# Patient Record
Sex: MALE | Race: BLACK OR AFRICAN AMERICAN | HISPANIC OR LATINO | Employment: FULL TIME | URBAN - METROPOLITAN AREA
[De-identification: names, ages, dates, MRNs, and addresses within clinical notes are randomized per-mention and may not be internally consistent; named-entity substitution may affect disease eponyms.]

---

## 2018-05-09 ENCOUNTER — CONVERSION ENCOUNTER (OUTPATIENT)
Dept: FAMILY MEDICINE CLINIC | Facility: CLINIC | Age: 38
End: 2018-05-09

## 2018-05-09 ENCOUNTER — OFFICE VISIT CONVERTED (OUTPATIENT)
Dept: FAMILY MEDICINE CLINIC | Facility: CLINIC | Age: 38
End: 2018-05-09
Attending: NURSE PRACTITIONER

## 2018-05-24 ENCOUNTER — CONVERSION ENCOUNTER (OUTPATIENT)
Dept: FAMILY MEDICINE CLINIC | Facility: CLINIC | Age: 38
End: 2018-05-24

## 2018-05-24 ENCOUNTER — OFFICE VISIT CONVERTED (OUTPATIENT)
Dept: FAMILY MEDICINE CLINIC | Facility: CLINIC | Age: 38
End: 2018-05-24
Attending: NURSE PRACTITIONER

## 2018-09-06 ENCOUNTER — OFFICE VISIT CONVERTED (OUTPATIENT)
Dept: FAMILY MEDICINE CLINIC | Facility: CLINIC | Age: 38
End: 2018-09-06
Attending: NURSE PRACTITIONER

## 2018-10-24 ENCOUNTER — CONVERSION ENCOUNTER (OUTPATIENT)
Dept: FAMILY MEDICINE CLINIC | Facility: CLINIC | Age: 38
End: 2018-10-24

## 2018-10-24 ENCOUNTER — OFFICE VISIT CONVERTED (OUTPATIENT)
Dept: FAMILY MEDICINE CLINIC | Facility: CLINIC | Age: 38
End: 2018-10-24
Attending: NURSE PRACTITIONER

## 2018-11-08 ENCOUNTER — OFFICE VISIT CONVERTED (OUTPATIENT)
Dept: FAMILY MEDICINE CLINIC | Facility: CLINIC | Age: 38
End: 2018-11-08
Attending: NURSE PRACTITIONER

## 2020-10-16 ENCOUNTER — HOSPITAL ENCOUNTER (OUTPATIENT)
Dept: FAMILY MEDICINE CLINIC | Facility: CLINIC | Age: 40
Discharge: HOME | End: 2020-10-16
Attending: NURSE PRACTITIONER

## 2020-10-16 ENCOUNTER — OFFICE VISIT CONVERTED (OUTPATIENT)
Dept: FAMILY MEDICINE CLINIC | Facility: CLINIC | Age: 40
End: 2020-10-16
Attending: NURSE PRACTITIONER

## 2020-10-16 LAB
CHOLEST SERPL-MCNC: 217 MG/DL (ref 107–200)
CHOLEST/HDLC SERPL: 3.2 {RATIO} (ref 3–6)
HDLC SERPL-MCNC: 68 MG/DL (ref 40–60)
LDLC SERPL CALC-MCNC: 138 MG/DL (ref 70–100)
TRIGL SERPL-MCNC: 55 MG/DL (ref 40–150)
VLDLC SERPL-MCNC: 11 MG/DL (ref 5–37)

## 2020-10-17 LAB — 25(OH)D3 SERPL-MCNC: 25.9 NG/ML (ref 30–100)

## 2020-10-28 ENCOUNTER — OFFICE VISIT CONVERTED (OUTPATIENT)
Dept: CARDIOLOGY | Facility: CLINIC | Age: 40
End: 2020-10-28
Attending: INTERNAL MEDICINE

## 2020-10-28 ENCOUNTER — CONVERSION ENCOUNTER (OUTPATIENT)
Dept: CARDIOLOGY | Facility: CLINIC | Age: 40
End: 2020-10-28

## 2020-11-20 ENCOUNTER — HOSPITAL ENCOUNTER (OUTPATIENT)
Dept: CARDIOLOGY | Facility: HOSPITAL | Age: 40
Discharge: HOME | End: 2020-11-20
Attending: INTERNAL MEDICINE

## 2021-01-22 ENCOUNTER — OFFICE VISIT CONVERTED (OUTPATIENT)
Dept: FAMILY MEDICINE CLINIC | Facility: CLINIC | Age: 41
End: 2021-01-22
Attending: NURSE PRACTITIONER

## 2021-01-22 ENCOUNTER — CONVERSION ENCOUNTER (OUTPATIENT)
Dept: FAMILY MEDICINE CLINIC | Facility: CLINIC | Age: 41
End: 2021-01-22

## 2021-02-27 ENCOUNTER — HOSPITAL ENCOUNTER (OUTPATIENT)
Dept: URGENT CARE | Facility: CLINIC | Age: 41
Discharge: HOME | End: 2021-02-27
Attending: PHYSICIAN ASSISTANT

## 2021-03-02 LAB — SARS-COV-2 RNA SPEC QL NAA+PROBE: NOT DETECTED

## 2021-04-23 ENCOUNTER — HOSPITAL ENCOUNTER (OUTPATIENT)
Dept: FAMILY MEDICINE CLINIC | Facility: CLINIC | Age: 41
Discharge: HOME | End: 2021-04-23
Attending: NURSE PRACTITIONER

## 2021-04-23 ENCOUNTER — OFFICE VISIT CONVERTED (OUTPATIENT)
Dept: FAMILY MEDICINE CLINIC | Facility: CLINIC | Age: 41
End: 2021-04-23
Attending: NURSE PRACTITIONER

## 2021-04-23 LAB
25(OH)D3 SERPL-MCNC: 37.5 NG/ML (ref 30–100)
ALBUMIN SERPL-MCNC: 4.2 G/DL (ref 3.5–5)
ALBUMIN/GLOB SERPL: 1.5 {RATIO} (ref 1.4–2.6)
ALP SERPL-CCNC: 79 U/L (ref 53–128)
ALT SERPL-CCNC: 25 U/L (ref 10–40)
ANION GAP SERPL CALC-SCNC: 14 MMOL/L (ref 8–19)
AST SERPL-CCNC: 20 U/L (ref 15–50)
BILIRUB SERPL-MCNC: 0.71 MG/DL (ref 0.2–1.3)
BUN SERPL-MCNC: 10 MG/DL (ref 5–25)
BUN/CREAT SERPL: 9 {RATIO} (ref 6–20)
CALCIUM SERPL-MCNC: 9.2 MG/DL (ref 8.7–10.4)
CHLORIDE SERPL-SCNC: 104 MMOL/L (ref 99–111)
CHOLEST SERPL-MCNC: 198 MG/DL (ref 107–200)
CHOLEST/HDLC SERPL: 3.1 {RATIO} (ref 3–6)
CONV CO2: 27 MMOL/L (ref 22–32)
CONV TOTAL PROTEIN: 7 G/DL (ref 6.3–8.2)
CREAT UR-MCNC: 1.17 MG/DL (ref 0.7–1.2)
GFR SERPLBLD BASED ON 1.73 SQ M-ARVRAT: >60 ML/MIN/{1.73_M2}
GLOBULIN UR ELPH-MCNC: 2.8 G/DL (ref 2–3.5)
GLUCOSE SERPL-MCNC: 77 MG/DL (ref 70–99)
HDLC SERPL-MCNC: 63 MG/DL (ref 40–60)
LDLC SERPL CALC-MCNC: 124 MG/DL (ref 70–100)
OSMOLALITY SERPL CALC.SUM OF ELEC: 290 MOSM/KG (ref 273–304)
POTASSIUM SERPL-SCNC: 4.2 MMOL/L (ref 3.5–5.3)
SODIUM SERPL-SCNC: 141 MMOL/L (ref 135–147)
TRIGL SERPL-MCNC: 53 MG/DL (ref 40–150)
VLDLC SERPL-MCNC: 11 MG/DL (ref 5–37)

## 2021-05-10 NOTE — H&P
History and Physical      Patient Name: Humphrey Iglesias   Patient ID: [id number]   Sex: Male   YOB: 1980    Primary Care Provider: Shannon Crocker MD   Referring Provider: Ken KWAN    Visit Date: 2020    Provider: Maximino Grider MD   Location: [facility]   Location Address: [address] [place] [address], [facility]   [place]  [id number]   Location Phone: [phone]          History Of Present Illness  Consult requested by: Ken KWAN   I saw Humphrey Iglesias in the office today. This is a 40 year old, /Black male. He has no previous cardiac history. He has been having intermittent chest discomfort over the last few weeks. He recently had an episode that was more significant, 5 to 6 out of 10, described as a pressure in central chest, non-radiating. He has had some occasional left-arm discomfort as well. He has been under stress recently, and recently his father  of uncertain causes but suspected cardiac death. The patient stays relatively active. Symptoms do not seem to be triggered by exertion and are random in occurrence.   PAST MEDICAL HISTORY: includes seasonal allergies. PAST SURGICAL HISTORY: Negative.   PSYCHOSOCIAL HISTORY: He smoked very lightly when he was much younger but not since then. Rare alcohol. Moderate caffeine intake. He is . He works as a  at a local company.   FAMILY HISTORY: Positive for hypertension, heart disease and diabetes.   CURRENT MEDICATIONS: include Claritin; Zyrtec. The dosage and frequency of the medications were reviewed with the patient.   ALLERGIES: No known drug allergies.       Review of Systems  · Constitutional  o Admits  o : good general health lately  o Denies  o : fatigue, recent weight changes   · Eyes  o Denies  o : double vision  · HENT  o Denies  o : hearing loss or ringing, chronic sinus problem, swollen glands in neck  · Cardiovascular  o Admits  o : chest pain  o Denies  o :  "palpitations (fast, fluttering, or skipping beats), swelling (feet, ankles, hands), shortness of breath while walking or lying flat  · Respiratory  o Denies  o : chronic or frequent cough, asthma or wheezing, COPD  · Gastrointestinal  o Denies  o : ulcers, nausea or vomiting  · Neurologic  o Admits  o : lightheaded or dizzy  o Denies  o : stroke, headaches  · Musculoskeletal  o Denies  o : joint pain, back pain  · Endocrine  o Denies  o : thyroid disease, diabetes, heat or cold intolerance, excessive thirst or urination  · Heme-Lymph  o Denies  o : bleeding or bruising tendency, anemia      Vitals  Date Time BP Position Site L\R Cuff Size HR RR TEMP (F) WT  HT  BMI kg/m2 BSA m2 O2 Sat FR L/min FiO2 HC       10/28/2020 10:08 /76 Sitting    70 - R   233lbs 16oz 6'  2\" 30.04 2.35             Physical Examination  · Constitutional  o Appearance  o : Overweight, while male, pleasant, in no acute distress.  · Head and Face  o HEENT  o : No pallor, anicteric. Eyes normal. Moist mucous membranes.  · Neck  o Inspection/Palpation  o : Supple. No hepatosplenomegaly.  o Jugular Veins  o : No JVD. No carotid bruits.  · Respiratory  o Auscultation of Lungs  o : Clear to auscultation bilaterally. No crackles or wheezing.  · Cardiovascular  o Heart  o : S1, S2 is normally heard. No S3. No murmur, rubs, or gallops.  · Gastrointestinal  o Abdominal Examination  o : Soft, non-distended. No palpable hepatosplenomegaly. Bowel sounds heard in all four quadrants.  · Musculoskeletal  o General  o : Normal muscle tone and strength.  · Skin and Subcutaneous Tissue  o General Inspection  o : No skin rashes.  · Extremities  o Extremities  o : Warm and well perfused. Distal pulses present. No pitting pedal edema.     His EKG was reviewed from October 14th that showed sinus rhythm, borderline first degree AV block, non-specific intraventricular conduction delay, no acute ST changes.    Emergency room records were reviewed.  His CBC was " normal.  CMP normal.  LDL cholesterol 138, TRG 55, .  Troponin was negative.    Chest X-ray showed no acute disease.           Assessment     Intermittent chest pain:  Somewhat atypical in pattern for angina.  He has no chronic cardiac risk factors.  His basic evaluation in the emergency room was low risk.       Plan     Schedule a stress echocardiogram to evaluate for any evidence of structural problems or ischemia.  If this  appears low risk, at this time no additional cardiac workup is indicated.  The patient is agreeable with this plan.  We will call the patient with results when available.    GABRIELLA everett/maylin           This note was transcribed by Bouchra Soriano.  maylin/cbd  The above service was transcribed by Bouchra Soriano, and I attest to the accuracy of the note.  CBD.             Electronically Signed by: Bouchra Soriano-, -Author on November 2, 2020 05:48:57 AM  Electronically Co-signed by: Maximino Grider MD -Reviewer on November 2, 2020 09:19:44 AM

## 2021-05-11 ENCOUNTER — HOSPITAL ENCOUNTER (OUTPATIENT)
Dept: GENERAL RADIOLOGY | Facility: HOSPITAL | Age: 41
Discharge: HOME | End: 2021-05-11
Attending: NURSE PRACTITIONER

## 2021-05-12 ENCOUNTER — OFFICE VISIT CONVERTED (OUTPATIENT)
Dept: ORTHOPEDIC SURGERY | Facility: CLINIC | Age: 41
End: 2021-05-12
Attending: ORTHOPAEDIC SURGERY

## 2021-05-14 VITALS
TEMPERATURE: 97.3 F | SYSTOLIC BLOOD PRESSURE: 104 MMHG | OXYGEN SATURATION: 96 % | WEIGHT: 244.06 LBS | HEART RATE: 75 BPM | DIASTOLIC BLOOD PRESSURE: 68 MMHG | HEIGHT: 74 IN | BODY MASS INDEX: 31.32 KG/M2

## 2021-05-14 VITALS
HEART RATE: 80 BPM | TEMPERATURE: 97 F | SYSTOLIC BLOOD PRESSURE: 108 MMHG | HEIGHT: 74 IN | DIASTOLIC BLOOD PRESSURE: 66 MMHG | WEIGHT: 249.37 LBS | OXYGEN SATURATION: 97 % | BODY MASS INDEX: 32 KG/M2

## 2021-05-14 VITALS
HEIGHT: 74 IN | SYSTOLIC BLOOD PRESSURE: 134 MMHG | OXYGEN SATURATION: 99 % | BODY MASS INDEX: 30.3 KG/M2 | HEART RATE: 74 BPM | WEIGHT: 236.12 LBS | DIASTOLIC BLOOD PRESSURE: 82 MMHG | TEMPERATURE: 97.1 F

## 2021-05-14 VITALS
DIASTOLIC BLOOD PRESSURE: 76 MMHG | HEIGHT: 74 IN | SYSTOLIC BLOOD PRESSURE: 124 MMHG | WEIGHT: 234 LBS | HEART RATE: 70 BPM | BODY MASS INDEX: 30.03 KG/M2

## 2021-05-14 NOTE — PROGRESS NOTES
Progress Note      Patient Name: Humphrey Iglesias   Patient ID: [id number]   Sex: Male   YOB: 1980    Primary Care Provider: Shannon Crocker MD   Referring Provider: Ken KWAN    Visit Date: January 22, 2021    Provider: DOMENIC Michaud   Location: [facility]   Location Address: [address] [place] [address], [address]  [place], [place]  [id number]   Location Phone: [phone]          Chief Complaint  · 3 month follow up      History Of Present Illness  Humphrey Iglesias is a 40 year old /Black male who presents for evaluation and treatment of:      Today for 3-month follow-up on vitamin D deficiency and chest pain.  He has recently seen cardiology.  He has had no chest pain for the past 3 months.  He had an EKG that showed borderline first-degree AV block type I.  Echo was within normal limits.  EF of 55-60%.       Past Medical History  Allergic rhinitis         Past Surgical History  *Denies any surgical procedures         Medication List  Vitamin D3 125 mcg (5,000 unit) oral tablet; Zyrtec oral         Allergy List  NO KNOWN DRUG ALLERGIES         Family Medical History  Diabetes, unspecified type         Social History  Tobacco (Former)         Immunizations  Name Date Admin   Tdap 02/29/2016         Review of Systems  · Constitutional  o Denies  o : fatigue, night sweats  · Eyes  o Denies  o : double vision, blurred vision  · HENT  o Denies  o : vertigo, recent head injury  · Cardiovascular  o Denies  o : chest pain, irregular heart beats, rapid heart rate, dyspnea on exertion  · Respiratory  o Denies  o : shortness of breath, productive cough  · Gastrointestinal  o Denies  o : nausea, vomiting, diarrhea, constipation  · Genitourinary  o Denies  o : dysuria, urinary retention  · Integument  o Denies  o : hair growth change, new skin lesions  · Neurologic  o Denies  o : altered mental status, seizures  · Musculoskeletal  o Denies  o : joint swelling, limitation of  "motion  · Endocrine  o Denies  o : cold intolerance, heat intolerance  · Heme-Lymph  o Denies  o : petechiae, lymph node enlargement or tenderness  · Allergic-Immunologic  o Denies  o : frequent illnesses      Vitals  Date Time BP Position Site L\R Cuff Size HR RR TEMP (F) WT  HT  BMI kg/m2 BSA m2 O2 Sat FR L/min FiO2 HC       01/22/2021 08:09 /68 Sitting    75 - R  97.3 244lbs 1oz 6'  2\" 31.34 2.4 96 %            Physical Examination  · Constitutional  o Appearance  o : alert, in no acute distress  · Head and Face  o Head  o :   § Inspection  § : atraumatic, normocephalic  o Face  o :   § Inspection  § : no facial lesions  o HEENT  o : Unremarkable  · Eyes  o Conjunctivae  o : conjunctivae normal  o Sclerae  o : sclerae white  o Pupils and Irises  o : pupils equal and round, pupils reactive to light bilaterally  o Eyelids/Ocular Adnexae  o : eyelid appearance normal  · Neck  o Inspection/Palpation  o : normal appearance, no masses or tenderness, trachea midline  o Thyroid  o : gland size normal, nontender, no nodules or masses present on palpation  · Respiratory  o Respiratory Effort  o : breathing unlabored  o Auscultation of Lungs  o : normal breath sounds  · Cardiovascular  o Heart  o :   § Auscultation of Heart  § : regular rate, normal rhythm, no murmurs present  o Peripheral Vascular System  o :   § Extremities  § : no edema  · Gastrointestinal  o Abdominal Examination  o : abdomen nontender to palpation, normal bowel sounds, tone normal without rigidity or guarding, no masses present  o Liver and spleen  o : no hepatomegaly present  · Lymphatic  o Neck  o : no lymphadenopathy   o Supraclavicular Nodes  o : no supraclavicular nodes          Assessment  · Hyperlipidemia     272.4/E78.5  Low-cholesterol diet  · Vitamin D deficiency     268.9/E55.9  Vitamin D 2 50,000 units weekly  · Need for influenza vaccination     V04.81/Z23  Declines      Plan  · Orders  o ACO-39: Current medications updated and " reviewed (, 1159F) - - 01/22/2021  · Medications  o Vitamin D2 1,250 mcg (50,000 unit) oral capsule   SIG: take 1 capsule by oral route weekly   DISP: (13) Capsule with 2 refills  Prescribed on 01/22/2021     o Vitamin D3 125 mcg (5,000 unit) oral tablet   SIG: take 1 tablet by oral route daily for 90 days   DISP: (90) Tablet with 1 refills  Discontinued on 01/22/2021     · Instructions  o Advised that cheeses and other sources of dairy fats, animal fats, fast food, and the extras (candy, pastries, pies, doughnuts and cookies) all contain LDL raising nutrients. Advised to increase fruits, vegetables, whole grains, and to monitor portion sizes.   o Patient was educated/instructed on their diagnosis, treatment and medications prior to discharge from the clinic today.  o Patient instructed to seek medical attention urgently for new or worsening symptoms.  o Call the office with any concerns or questions.  · Disposition  o Call or Return if symptoms worsen or persist.  o f/u 3 months            Electronically Signed by: DOMENIC Michaud -Author on January 22, 2021 08:37:21 AM

## 2021-05-14 NOTE — PROGRESS NOTES
Progress Note      Patient Name: Humphrey Iglesias   Patient ID: [id number]   Sex: Male   YOB: 1980    Primary Care Provider: Shannon Crocker MD   Referring Provider: Shannon Crocker MD    Visit Date: April 23, 2021    Provider: DOMENIC Michaud   Location: [facility]   Location Address: [address] [place] [address], [address] [place] [address]  [id number]   Location Phone: [phone]          Chief Complaint  · 3 month f/u      History Of Present Illness  Humphrey Iglesias is a 40 year old /Black male who presents for evaluation and treatment of:      Presents today for 3-month follow-up on vitamin D deficiency, hyperlipidemia, and intermittent chest pain.  He has not had any intermittent chest pain over the past 3 months.  He is follow low-cholesterol diet for his hyperlipidemia.  He is taking vitamin D2 50,000 units weekly for vitamin D deficiency.  Only complaint today is right knee pain.  He has intermittent right knee pain primarily on the lateral medial side.  Denies swelling and redness.  He states approximately 3 to 5 years ago that he had dislocated his knee.  He had an x-ray done at [facility] at that time.  History of allergic rhinitis.  He has some nasal congestion but is well controlled with Claritin or Zyrtec that he takes       Past Medical History  Allergic rhinitis         Past Surgical History  *Denies any surgical procedures         Medication List  Claritin 10 mg oral tablet; Vitamin D2 1,250 mcg (50,000 unit) oral capsule; Zyrtec oral         Allergy List  NO KNOWN DRUG ALLERGIES         Family Medical History  Diabetes, unspecified type         Social History  Tobacco (Former)         Immunizations  Name Date Admin   Tdap 02/29/2016         Review of Systems  · Constitutional  o Denies  o : fatigue, night sweats  · Eyes  o Denies  o : double vision, blurred vision  · HENT  o Admits  o : nasal congestion  o Denies  o : headaches, vertigo,  "lightheadedness, recent head injury, sinus pain, postnasal drip, sore throat  · Cardiovascular  o Denies  o : chest pain, irregular heart beats  · Respiratory  o Denies  o : shortness of breath, productive cough  · Gastrointestinal  o Denies  o : nausea, vomiting, diarrhea, constipation, reflux, abdominal pain, blood in stools  · Genitourinary  o Denies  o : dysuria, urinary retention  · Integument  o Denies  o : hair growth change, new skin lesions  · Neurologic  o Denies  o : altered mental status, seizures  · Musculoskeletal  o Admits  o : joint pain, knee pain  o Denies  o : joint swelling, limitation of motion  · Endocrine  o Denies  o : cold intolerance, heat intolerance  · Psychiatric  o Denies  o : anxiety, depression  · Heme-Lymph  o Denies  o : petechiae, lymph node enlargement or tenderness  · Allergic-Immunologic  o Denies  o : frequent illnesses      Vitals  Date Time BP Position Site L\R Cuff Size HR RR TEMP (F) WT  HT  BMI kg/m2 BSA m2 O2 Sat FR L/min FiO2 HC       04/23/2021 08:23 /66 Sitting    80 - R  97 249lbs 6oz 6'  2\" 32.02 2.43 97 %            Physical Examination  · Constitutional  o Appearance  o : alert, in no acute distress  · Head and Face  o Head  o :   § Inspection  § : atraumatic, normocephalic  o Face  o :   § Inspection  § : no facial lesions  o HEENT  o : Unremarkable  · Eyes  o Conjunctivae  o : conjunctivae normal  o Sclerae  o : sclerae white  o Pupils and Irises  o : pupils equal and round, pupils reactive to light bilaterally  o Eyelids/Ocular Adnexae  o : eyelid appearance normal  · Ears, Nose, Mouth and Throat  o Ears  o :   § External Ears  § : appearance within normal limits, no lesions present  o Nose  o :   § External Nose  § : appearance normal  o Oral Cavity  o :   § Oral Mucosa  § : oral mucosa normal  § Lips  § : lip appearance normal  § Teeth  § : normal dentition for age  § Gums  § : gums pink, non-swollen, no bleeding present  § Tongue  § : tongue appearance " normal  § Palate  § : hard palate normal, soft palate appearance normal  · Neck  o Inspection/Palpation  o : normal appearance, no masses or tenderness, trachea midline  o Thyroid  o : gland size normal, nontender, no nodules or masses present on palpation  · Respiratory  o Respiratory Effort  o : breathing unlabored  o Auscultation of Lungs  o : normal breath sounds  · Cardiovascular  o Heart  o :   § Auscultation of Heart  § : regular rate, normal rhythm, no murmurs present  o Peripheral Vascular System  o :   § Extremities  § : no edema  · Gastrointestinal  o Abdominal Examination  o : abdomen nontender to palpation, normal bowel sounds, tone normal without rigidity or guarding, no masses present  o Liver and spleen  o : no hepatomegaly present  · Lymphatic  o Neck  o : no lymphadenopathy   o Supraclavicular Nodes  o : no supraclavicular nodes  · Right Knee  o Inspection  o : no redness, swelling, deformity, bruising, or effusion  o Palpation  o : non tender to palpation, no crepitus  o Range of Motion  o : normal range of motion  o Reflexes  o : normal reflexes  o Special Tests  o : Bulge sign/Balloon sign/Ballotting the Patella are negative; Nayeli test reveals no click is appreciated; Valgus Stress test is normal; Varus Stress test is normal; Anterior Drawer sign is negative; Lachman's test is negative; Posterior Drawer sign is negative          Assessment  · Allergic rhinitis due to allergen     477.9/J30.9  Continue current regimen. Discussed if symptoms worsen may need Flonase or Nasacort  · Hyperlipidemia     272.4/E78.5  CMP and lipid  · Vitamin D deficiency     268.9/E55.9  Check vitamin D  · Knee pain, right     719.46/M25.561  X-ray right knee, consult orthopedics      Plan  · Orders  o CMP [facility] (06074) - 272.4/E78.5, 268.9/E55.9 - 04/23/2021  o Lipid Panel [facility] (01165) - 272.4/E78.5 - 04/23/2021  o Vitamin D (25-Hydroxy) Level (90998) - 268.9/E55.9 - 04/23/2021  o ACO-39: Current medications updated  and reviewed (, 1159F) - - 04/23/2021  o ORTHOPEDIC CONSULTATION (ORTHO) - 719.46/M25.561 - 04/23/2021  o Xray knee right [facility] (26008-IS) - 719.46/M25.561 - 04/23/2021  · Instructions  o Advised that cheeses and other sources of dairy fats, animal fats, fast food, and the extras (candy, pastries, pies, doughnuts and cookies) all contain LDL raising nutrients. Advised to increase fruits, vegetables, whole grains, and to monitor portion sizes.   o Patient was educated/instructed on their diagnosis, treatment and medications prior to discharge from the clinic today.  o Patient instructed to seek medical attention urgently for new or worsening symptoms.  o Call the office with any concerns or questions.  · Disposition  o Call or Return if symptoms worsen or persist.  o f/u 6 months            Electronically Signed by: DOMENIC Michaud -Author on April 23, 2021 08:55:52 AM

## 2021-05-16 VITALS
HEART RATE: 63 BPM | TEMPERATURE: 98.7 F | WEIGHT: 241 LBS | HEIGHT: 74 IN | DIASTOLIC BLOOD PRESSURE: 66 MMHG | SYSTOLIC BLOOD PRESSURE: 122 MMHG | BODY MASS INDEX: 30.93 KG/M2 | OXYGEN SATURATION: 99 %

## 2021-05-16 VITALS
DIASTOLIC BLOOD PRESSURE: 64 MMHG | HEART RATE: 76 BPM | SYSTOLIC BLOOD PRESSURE: 132 MMHG | TEMPERATURE: 98.9 F | BODY MASS INDEX: 30.54 KG/M2 | HEIGHT: 74 IN | OXYGEN SATURATION: 99 % | WEIGHT: 238 LBS

## 2021-05-16 VITALS
DIASTOLIC BLOOD PRESSURE: 84 MMHG | HEART RATE: 87 BPM | OXYGEN SATURATION: 98 % | HEIGHT: 74 IN | SYSTOLIC BLOOD PRESSURE: 132 MMHG | BODY MASS INDEX: 30.74 KG/M2 | TEMPERATURE: 98 F | WEIGHT: 239.5 LBS

## 2021-05-16 VITALS
SYSTOLIC BLOOD PRESSURE: 106 MMHG | TEMPERATURE: 98.1 F | OXYGEN SATURATION: 98 % | HEIGHT: 74 IN | HEART RATE: 70 BPM | WEIGHT: 241.5 LBS | BODY MASS INDEX: 30.99 KG/M2 | DIASTOLIC BLOOD PRESSURE: 76 MMHG

## 2021-05-16 VITALS
OXYGEN SATURATION: 99 % | WEIGHT: 239 LBS | SYSTOLIC BLOOD PRESSURE: 120 MMHG | BODY MASS INDEX: 30.67 KG/M2 | DIASTOLIC BLOOD PRESSURE: 72 MMHG | HEIGHT: 74 IN | TEMPERATURE: 98.6 F | HEART RATE: 71 BPM

## 2021-06-02 ENCOUNTER — HOSPITAL ENCOUNTER (OUTPATIENT)
Dept: MRI IMAGING | Facility: HOSPITAL | Age: 41
Discharge: HOME | End: 2021-06-02
Attending: ORTHOPAEDIC SURGERY

## 2021-06-05 NOTE — H&P
History and Physical      Patient Name: Humphrey Iglesias   Patient ID: [id number]   Sex: Male   YOB: 1980    Primary Care Provider: Shannon Crocker MD   Referring Provider: Shannon Crocker MD    Visit Date: May 12, 2021    Provider: Randy Peterson MD   Location: [facility]   Location Address: [address] [place] [address]  [id number]   Location Phone: [phone]          Chief Complaint  · Right Knee Pain      History Of Present Illness  Humphrey Iglesias is a 40 year old /Black male who presents today to [facility].      Patient presents today for an evaluation of right knee. About 10 years ago he dislocated his whole knee while playing basketball. He states that he had landed poorly on his right knee, felt a pop and noticed his leg was moved over. He was given a leg brace and sent home by ER. He states since then his knee has bothered him off and on. He complains more of pain than instability. He is cautious on how he moves on his right knee because if he moves too swiftly he feels like his knee will buckle and give way. He avoid pivoting, twisting or turning on his knee. He works at [employer] on [employer].       Past Medical History  Allergic rhinitis         Past Surgical History  *Denies any surgical procedures         Medication List  Claritin 10 mg oral tablet; Vitamin D2 1,250 mcg (50,000 unit) oral capsule; Zyrtec oral         Allergy List  NO KNOWN DRUG ALLERGIES       Allergies Reconciled  Family Medical History  Diabetes, unspecified type         Social History  Alcohol Use (Current some day); lives with children; lives with spouse; .; Recreational Drug Use (Former); Tobacco (Former); Working         Immunizations  Name Date Admin   Tdap 02/29/2016         Review of Systems  · Constitutional  o Denies  o : fever, chills, weight loss  · Cardiovascular  o Denies  o : chest pain, shortness of breath  · Gastrointestinal  o Denies  o : liver disease,  "heartburn, nausea, blood in stools  · Genitourinary  o Denies  o : painful urination, blood in urine  · Integument  o Denies  o : rash, itching  · Neurologic  o Denies  o : headache, weakness, loss of consciousness  · Musculoskeletal  o Denies  o : painful, swollen joints  · Psychiatric  o Denies  o : drug/alcohol addiction, anxiety, depression      Vitals  Date Time BP Position Site L\R Cuff Size HR RR TEMP (F) WT  HT  BMI kg/m2 BSA m2 O2 Sat FR L/min FiO2        05/12/2021 07:49 AM      78 - R   257lbs 0oz 6'  2\" 33 2.47 97 %            Physical Examination  · Constitutional  o Appearance  o : well developed, well-nourished, no obvious deformities present  · Head and Face  o Head  o :   § Inspection  § : normocephalic  o Face  o :   § Inspection  § : no facial lesions  · Eyes  o Conjunctivae  o : conjunctivae normal  o Sclerae  o : sclerae white  · Ears, Nose, Mouth and Throat  o Ears  o :   § External Ears  § : appearance within normal limits  § Hearing  § : intact  o Nose  o :   § External Nose  § : appearance normal  · Neck  o Inspection/Palpation  o : normal appearance  o Range of Motion  o : full range of motion  · Respiratory  o Respiratory Effort  o : breathing unlabored  o Inspection of Chest  o : normal appearance  o Auscultation of Lungs  o : no audible wheezing or rales  · Cardiovascular  o Heart  o : regular rate  · Gastrointestinal  o Abdominal Examination  o : soft and non-tender  · Skin and Subcutaneous Tissue  o General Inspection  o : intact, no rashes  · Psychiatric  o General  o : Alert and oriented x3  o Judgement and Insight  o : judgment and insight intact  o Mood and Affect  o : mood normal, affect appropriate  · Right Knee  o Inspection  o : Calf supple, non-tender. Skin intact. Sensation grossly intact. Neurovascular intact. No swelling, skin discoloration or atrophy. Tender medial and lateral joint line. Weight bearing. Stable to valgus/varus stress. Good strength in quadriceps, " hamstrings, dorsiflexors, and plantar flexors. Positive Lachman. Full flexion and extension. Mildly antalgic gait.   · Imaging  o Imaging  o : 4/23/21 RIGHT KNEE X-RAY: CONCLUSION: Normal examination.           Assessment  · Right knee pain, unspecified chronicity     719.46/M25.561      Plan  · Medications  o Medications have been Reconciled  o Transition of Care or Provider Policy  · Instructions  o Dr. Peterson saw and examined the patient and agrees with plan.   o X-rays reviewed by Dr. Peterson.  o Reviewed the patient's Past Medical, Social, and Family history as well as the ROS at today's visit, no changes.  o Call or return if worsening symptoms.  o Follow up after MRI.  o The above service was scribed by Mari Quinn on my behalf and I attest to the accuracy of the note. filippo  o Discussed treatment plans and diagnosis with the patient. We discussed getting an MRI to rule out an ACL tear. Patient wishes to proceed with obtaining an MRI of his right knee.            Electronically Signed by: Mari Quinn-, Other -Author on May 12, 2021 02:25:24 PM  Electronically Co-signed by: Randy Peterson MD -Reviewer on May 14, 2021 09:25:59 PM

## 2021-06-07 ENCOUNTER — OFFICE VISIT (OUTPATIENT)
Dept: ORTHOPEDIC SURGERY | Facility: CLINIC | Age: 41
End: 2021-06-07

## 2021-06-07 VITALS — HEIGHT: 74 IN | HEART RATE: 72 BPM | BODY MASS INDEX: 32.98 KG/M2 | OXYGEN SATURATION: 97 % | WEIGHT: 257 LBS

## 2021-06-07 DIAGNOSIS — M76.51 PATELLAR TENDONITIS OF RIGHT KNEE: Primary | ICD-10-CM

## 2021-06-07 PROCEDURE — 99213 OFFICE O/P EST LOW 20 MIN: CPT | Performed by: ORTHOPAEDIC SURGERY

## 2021-06-07 RX ORDER — ERGOCALCIFEROL 1.25 MG/1
50000 CAPSULE ORAL
COMMUNITY
Start: 2021-04-20 | End: 2021-10-29 | Stop reason: SDUPTHER

## 2021-06-07 RX ORDER — CETIRIZINE HYDROCHLORIDE 10 MG/1
CAPSULE, LIQUID FILLED ORAL
COMMUNITY

## 2021-06-07 RX ORDER — LORATADINE 10 MG/1
TABLET ORAL
COMMUNITY

## 2021-06-07 NOTE — PROGRESS NOTES
"Chief Complaint  Follow-up of the Right Knee     Subjective      Humphrey Iglesias presents to [facility] for a follow-up of the right knee. Patient presents today with MRI results of his right knee. To review, about 10 years ago he dislocated his whole knee while playing basketball. He states that he had landed poorly on his right knee, felt a pop and noticed his leg was moved over. He was given a leg brace and sent home by ER. He states since then his knee has bothered him off and on. He complains more of pain than instability. He is cautious on how he moves on his right knee because if he moves too swiftly he feels like his knee will buckle and give way. He avoid pivoting, twisting or turning on his knee. He works at [employer] on [address]. Since his last visit with us he states that his pain has improved some. He does complain of pain with weight bearing on his right knee. He feels pain about the patella. He still tries avoiding any pivoting and twisting motion.     No Known Allergies     Social History     Socioeconomic History   • Marital status:      Spouse name: Not on file   • Number of children: Not on file   • Years of education: Not on file   • Highest education level: Not on file   Tobacco Use   • Smoking status: Former Smoker   Substance and Sexual Activity   • Alcohol use: Yes     Comment: occasionally drinks, less than 1 drink per day, has been drinking for 3 years   • Drug use: Not Currently     Comment: former in the past        Review of Systems     Objective   Vital Signs:   Pulse 72   Ht 188 cm (74\")   Wt 117 kg (257 lb)   SpO2 97%   BMI 33.00 kg/m²       Physical Exam  Constitutional:       Appearance: Normal appearance. He is well-developed and normal weight.   HENT:      Head: Normocephalic.      Right Ear: Hearing and external ear normal.      Left Ear: Hearing and external ear normal.      Nose: Nose normal.   Eyes:      Conjunctiva/sclera: Conjunctivae " normal.   Cardiovascular:      Rate and Rhythm: Normal rate.   Pulmonary:      Effort: Pulmonary effort is normal.      Breath sounds: No wheezing or rales.   Abdominal:      Palpations: Abdomen is soft.      Tenderness: There is no abdominal tenderness.   Musculoskeletal:      Cervical back: Normal range of motion.   Skin:     Findings: No rash.   Neurological:      Mental Status: He is alert and oriented to person, place, and time.   Psychiatric:         Mood and Affect: Mood and affect normal.         Judgment: Judgment normal.     Ortho Exam   Calf supple, non-tender. Skin intact. Sensation grossly intact. Neurovascular intact. No swelling, skin discoloration or atrophy. Tender medial and lateral joint line. Weight bearing. Stable to valgus/varus stress. Good strength in quadriceps, hamstrings, dorsiflexors, and plantar flexors. Negative Lachman. Full flexion and extension. Non-antalgic gait. Good tone of hip flexors, hip extensors, hip adductor, hip abductors.      Procedures    Imaging Results (Most Recent)     None           Result Review :       PROCEDURE: KNEE 3 VIEWS RIGHT     COMPARISON: [facility] , KNEE 3 VIEWS RT, 8/22/2010, 18:22.     INDICATIONS: RIGHT KNEE PAIN ON AND OFF FOR YEARS. NO INJURY.     FINDINGS:   BONES: Normal.  No significant arthropathy or acute abnormality.    SOFT TISSUES: Negative.  No visible soft tissue swelling.    EFFUSION: None visible.    OTHER: Negative.       CONCLUSION: Normal examination.        JANE MONK MD         Electronically Signed and Approved By: JANE MONK MD on 5/11/2021 at 12:23                 PROCEDURE: MRI RIGHT KNEE WO CONTRAST         [facility]  [facility]21[facility] 11:42.         INDICATIONS: TRANSIENT RIGHT ANTEROLATERAL KNEE PAIN.  POST INJURY 10 YEARS AGO.         TECHNIQUE: A complete multi-planar MRI was  performed.           FINDINGS:     SOFT TISSUES:  Trace knee joint fluid.  Elongated 1.4 cm osteophyte or osteochondral body along the     posterior rim of the medial tibial plateau, posterior to the medial meniscus posterior horn. No     popliteal cyst. No soft tissue mass.         MENISCI: The medial meniscus is intact. The lateral meniscus is intact.         CRUCIATE LIGAMENTS: The ACL is intact. The PCL is intact.         COLLATERAL LIGAMENTS: The MCL is intact. The LCL complex is intact.         EXTENSOR MECHANISM: The quadriceps tendon is intact. The patellar tendon is intact.         ARTICULAR CARTILAGE:  Focal full-thickness chondral fissuring at the medial patellar facet.  The     articular cartilage in the medial and lateral compartments is fairly well maintained.         BONES:  Likely degenerative subchondral cystic changes at the posterior medial tibial plateau.  No     fracture.  No concerning bone marrow lesion or marrow replacing process.         CONCLUSION:     1. Focal full-thickness chondral fissuring at the medial patellar facet.    2. Elongated osteochondral body or osteophyte along the posterior margin of the medial tibial     plateau with degenerative subchondral cystic changes in the posterior medial tibial plateau.    3. Otherwise, unremarkable MRI of the knee.  The menisci, cruciate ligaments, and collateral     ligaments are intact.          CLAUS DEAN MD           Electronically Signed and Approved By: CLAUS DEAN MD on 6/02/2021     Assessment and Plan     DX: Right knee injury    Right knee pain     Call or return if worsening symptoms.    Follow Up     Discussed treatment options and diagnosis. We discussed injections and physical therapy. Patient states pain isn't unbearable, so he will hold off on an injection at this time. Patient was provided with a prescription for physical therapy if he wishes to attend. We also suggested taking anti-inflammatory.       Patient was given  instructions and counseling regarding his condition or for health maintenance advice. Please see specific information pulled into the AVS if appropriate.     Scribed for Randy Peterson MD by Mari Quinn.  06/07/21   08:28 EDT    I have personally performed the services described in this document as scribed by the above individual, and it is both accurate and complete.  Randy Peterson MD  6/7/2021  08:52 EDT

## 2021-06-11 ENCOUNTER — TREATMENT (OUTPATIENT)
Dept: PHYSICAL THERAPY | Facility: CLINIC | Age: 41
End: 2021-06-11

## 2021-06-11 DIAGNOSIS — G89.29 CHRONIC PAIN OF RIGHT KNEE: ICD-10-CM

## 2021-06-11 DIAGNOSIS — M25.561 CHRONIC PAIN OF RIGHT KNEE: ICD-10-CM

## 2021-06-11 DIAGNOSIS — R29.898 DECREASED STRENGTH INVOLVING KNEE JOINT: ICD-10-CM

## 2021-06-11 DIAGNOSIS — M76.51 PATELLAR TENDONITIS OF RIGHT KNEE: Primary | ICD-10-CM

## 2021-06-11 PROCEDURE — 97110 THERAPEUTIC EXERCISES: CPT | Performed by: PHYSICAL THERAPIST

## 2021-06-11 PROCEDURE — 97161 PT EVAL LOW COMPLEX 20 MIN: CPT | Performed by: PHYSICAL THERAPIST

## 2021-06-11 NOTE — PROGRESS NOTES
"Physical Therapy Initial Evaluation and Plan of Care      Patient: Humphrey Iglesias   : 1980  Diagnosis/ICD-10 Code:  Patellar tendonitis of right knee [M76.51]  Referring practitioner: Randy Peterson MD  Date of Initial Visit: 2021  Today's Date: 2021  Patient seen for 1 sessions    Progress note due: 2021  Re-cert due: 2021           Subjective Questionnaire: LEFS: 70/80    Precautions/Contraindication: n/a      Subjective Evaluation    History of Present Illness  Mechanism of injury: H&P: \"Humphrey Iglesias presents to [facility] for a follow-up of the right knee. Patient presents today with MRI results of his right knee. To review, about 10 years ago he dislocated his whole knee while playing basketball. He states that he had landed poorly on his right knee, felt a pop and noticed his leg was moved over. He was given a leg brace and sent home by ER. He states since then his knee has bothered him off and on. He complains more of pain than instability. He is cautious on how he moves on his right knee because if he moves too swiftly he feels like his knee will buckle and give way. He avoid pivoting, twisting or turning on his knee. He works at [employer] on [employer]. Since his last visit with us he states that his pain has improved some. He does complain of pain with weight bearing on his right knee. He feels pain about the patella. He still tries avoiding any pivoting and twisting motion. \"     MRI: . Focal full-thickness chondral fissuring at the medial patellar facet.    2. Elongated osteochondral body or osteophyte along the posterior margin of the medial tibial     plateau with degenerative subchondral cystic changes in the posterior medial tibial plateau.    3. Otherwise, unremarkable MRI of the knee.  The menisci, cruciate ligaments, and collateral     ligaments are intact.           Subjective comment: Pt states his knee prevent him from doing any extranous " but he is still about to walk and do daily activities  Patient Occupation: : be to lift up to 70 lb Pain  Current pain ratin  At worst pain ratin  Quality: pressure and dull ache  Relieving factors: ice, medications and rest  Aggravating factors: squatting, repetitive movement, ambulation and standing    Diagnostic Tests  MRI studies: abnormal    Patient Goals  Patient goals for therapy: increased strength, improved balance and decreased pain             Objective          Active Range of Motion   Left Knee   Flexion: 130 degrees   Extension: 0 degrees     Right Knee   Flexion: 30 degrees   Extension: 0 degrees     Passive Range of Motion     Right Knee   Flexion: with pain    Strength/Myotome Testing     Left Hip   Planes of Motion   Flexion: 4+    Right Hip   Planes of Motion   Flexion: 4+    Left Knee   Flexion: 5  Extension: 5    Right Knee   Flexion: 5  Extension: 5 and WFL    Functional Assessment   Squat   Pain and right valgus.               Assessment & Plan     Assessment  Impairments: abnormal gait, abnormal or restricted ROM, activity intolerance, impaired balance, impaired physical strength, pain with function and weight-bearing intolerance  Functional Limitations: walking, uncomfortable because of pain, sitting, standing and unable to perform repetitive tasks  Goals  Plan Goals: KNEE PROBLEMS:     1. The patient has limited ROM of the R knee.   LTG 1: 8 weeks:  The patient will demonstrate 0 to 140 degrees of ROM for the R knee in order to allow patient to complete prolonged walking, standing, stairs and other ADLs with decreased pain/difficulty.    STATUS:  New         2. The patient has limited strength of the B hips .   LTG 2: 8 weeks: The patient will demonstrate 5/5 strength for B hip in order to allow patient improved joint stability    STATUS:  New         3. The patient has gait dysfunction.   LTG 3: 8 weeks:  The patient will ambulate without assistive device, independently,  for community distances with minimal limp to the R lower extremity in order to improve mobility and allow patient to perform activities such as grocery shopping with greater ease.    STATUS:  New   TREATMENT: Gait training, aquatic therapy, therapeutic exercise, and home exercise instruction.    4. Mobility: Walking/Moving Around Functional Limitation     LTG 4: 8 weeks:  Pt will improve LEFS score to 78/80 to decrease limitation.    STATUS:  New      TREATMENT:  Manual therapy, therapeutic exercise, home exercise instruction.     Plan  Therapy options: will be seen for skilled physical therapy services  Planned modality interventions: cryotherapy  Planned therapy interventions: balance/weight-bearing training, flexibility, functional ROM exercises, gait training, home exercise program, joint mobilization, manual therapy, neuromuscular re-education, soft tissue mobilization, strengthening, stretching and therapeutic activities  Frequency: 1x week  Duration in weeks: 8  Treatment plan discussed with: patient        Visit Diagnoses:    ICD-10-CM ICD-9-CM   1. Patellar tendonitis of right knee  M76.51 726.64   2. Chronic pain of right knee  M25.561 719.46    G89.29 338.29   3. Decreased strength involving knee joint  R29.898 729.89       Timed:  Manual Therapy:    0     mins  35129;  Therapeutic Exercise:    8     mins  04170;     Neuromuscular David:    0    mins  21713;    Therapeutic Activity:     0     mins  63794;     Gait Trainin     mins  96618;     Ultrasound:     0     mins  81395;    Electrical Stimulation:    0     mins  57646 ( );    Untimed:  Electrical Stimulation:    0     mins  64527 ( );  Mechanical Traction:    0     mins  55398;   PT low complex eval: 30 min 63928     Timed Treatment: 8   mins   Total Treatment:     38   mins    PT SIGNATURE: Miri Moran PT, DPT        Initial Certification  Certification Period: 2021 thru 2021  I certify that the therapy services are  furnished while this patient is under my care.  The services outlined above are required by this patient, and will be reviewed every 90 days.     PHYSICIAN: Randy Peterson MD      DATE:     Please sign and return via fax to [phone].. Thank you, [facility].

## 2021-07-15 VITALS — WEIGHT: 257 LBS | HEART RATE: 78 BPM | BODY MASS INDEX: 32.98 KG/M2 | OXYGEN SATURATION: 97 % | HEIGHT: 74 IN

## 2021-08-12 ENCOUNTER — DOCUMENTATION (OUTPATIENT)
Dept: PHYSICAL THERAPY | Facility: CLINIC | Age: 41
End: 2021-08-12

## 2021-08-12 NOTE — PROGRESS NOTES
Discharge Summary  Discharge Summary from Physical Therapy Report        Number of Visits: 1         Goals: Not Met    Discharge Plan: Patient to return to referring/providing physician    Comments Pt not seen in over a month.    Date of Discharge 8/12/21        Miri Moran, PT  Physical Therapist

## 2021-10-29 ENCOUNTER — OFFICE VISIT (OUTPATIENT)
Dept: FAMILY MEDICINE CLINIC | Facility: CLINIC | Age: 41
End: 2021-10-29

## 2021-10-29 VITALS
DIASTOLIC BLOOD PRESSURE: 76 MMHG | TEMPERATURE: 97.3 F | OXYGEN SATURATION: 98 % | WEIGHT: 249.4 LBS | SYSTOLIC BLOOD PRESSURE: 136 MMHG | HEIGHT: 74 IN | HEART RATE: 81 BPM | BODY MASS INDEX: 32.01 KG/M2

## 2021-10-29 DIAGNOSIS — J30.1 SEASONAL ALLERGIC RHINITIS DUE TO POLLEN: Primary | ICD-10-CM

## 2021-10-29 DIAGNOSIS — Z23 NEED FOR INFLUENZA VACCINATION: ICD-10-CM

## 2021-10-29 DIAGNOSIS — E55.9 VITAMIN D DEFICIENCY: ICD-10-CM

## 2021-10-29 PROBLEM — J30.9 ALLERGIC RHINITIS: Status: ACTIVE | Noted: 2021-10-29

## 2021-10-29 PROCEDURE — 99213 OFFICE O/P EST LOW 20 MIN: CPT | Performed by: NURSE PRACTITIONER

## 2021-10-29 RX ORDER — ERGOCALCIFEROL 1.25 MG/1
50000 CAPSULE ORAL
Qty: 13 CAPSULE | Refills: 1 | Status: SHIPPED | OUTPATIENT
Start: 2021-10-29 | End: 2022-05-13

## 2021-10-29 NOTE — PROGRESS NOTES
"Chief Complaint  Follow-up and Hyperlipidemia    Subjective          Humphrey Iglesias presents to [facility]  History of Present Illness  Presents today for a follow-up on allergic rhinitis and vitamin D deficiency.  He states his allergies are well controlled alternating with Zyrtec and Claritin.  He takes vitamin D weekly.  Denies chest pain, CP, palpitations.  Denies fever chills.  Denies coughing shortness of breath and wheezing.  Objective   Vital Signs:   /76   Pulse 81   Temp 97.3 °F (36.3 °C)   Ht 188 cm (74\")   Wt 113 kg (249 lb 6.4 oz)   SpO2 98%   BMI 32.02 kg/m²     Physical Exam  Vitals reviewed.   Constitutional:       Appearance: Normal appearance. He is well-developed.   HENT:      Head: Normocephalic and atraumatic.      Right Ear: External ear normal.      Left Ear: External ear normal.      Mouth/Throat:      Pharynx: No oropharyngeal exudate.   Eyes:      Conjunctiva/sclera: Conjunctivae normal.      Pupils: Pupils are equal, round, and reactive to light.   Cardiovascular:      Rate and Rhythm: Normal rate and regular rhythm.      Heart sounds: No murmur heard.  No friction rub. No gallop.    Pulmonary:      Effort: Pulmonary effort is normal.      Breath sounds: Normal breath sounds. No wheezing or rhonchi.   Abdominal:      General: Bowel sounds are normal. There is no distension.      Palpations: Abdomen is soft.      Tenderness: There is no abdominal tenderness.   Skin:     General: Skin is warm and dry.   Neurological:      Mental Status: He is alert and oriented to person, place, and time.   Psychiatric:         Mood and Affect: Mood and affect normal.         Behavior: Behavior normal.         Thought Content: Thought content normal.         Judgment: Judgment normal.        Result Review :     Common labs    Common Labsle 4/23/21 4/23/21    0857 0857   Glucose 77    BUN 10    Creatinine 1.17    Sodium 141    Potassium 4.2    Chloride 104  "   Calcium 9.2    Albumin 4.2    Total Bilirubin 0.71    Alkaline Phosphatase 79    AST (SGOT) 20    ALT (SGPT) 25    Total Cholesterol  198   Triglycerides  53   HDL Cholesterol  63 (A)   LDL Cholesterol   124 (A)   (A) Abnormal value       Comments are available for some flowsheets but are not being displayed.                     Assessment and Plan    Diagnoses and all orders for this visit:    1. Seasonal allergic rhinitis due to pollen (Primary)  Assessment & Plan:  Continue the Zyrtec Claritin as needed.      2. Vitamin D deficiency  Comments:  Continue vitamin D.  Recheck vitamin D at next office visit  Orders:  -     vitamin D (ERGOCALCIFEROL) 1.25 MG (96740 UT) capsule capsule; Take 1 capsule by mouth Every 7 (Seven) Days.  Dispense: 13 capsule; Refill: 1    3. Need for influenza vaccination  Comments:  declines      Follow Up   No follow-ups on file.  Patient was given instructions and counseling regarding his condition or for health maintenance advice. Please see specific information pulled into the AVS if appropriate.

## 2021-12-15 ENCOUNTER — HOSPITAL ENCOUNTER (EMERGENCY)
Facility: HOSPITAL | Age: 41
Discharge: HOME | End: 2021-12-15
Attending: EMERGENCY MEDICINE | Admitting: EMERGENCY MEDICINE

## 2021-12-15 ENCOUNTER — APPOINTMENT (OUTPATIENT)
Dept: GENERAL RADIOLOGY | Facility: HOSPITAL | Age: 41
End: 2021-12-15

## 2021-12-15 VITALS
BODY MASS INDEX: 30.8 KG/M2 | HEIGHT: 74 IN | OXYGEN SATURATION: 99 % | WEIGHT: 240 LBS | SYSTOLIC BLOOD PRESSURE: 123 MMHG | HEART RATE: 58 BPM | TEMPERATURE: 97.7 F | RESPIRATION RATE: 20 BRPM | DIASTOLIC BLOOD PRESSURE: 77 MMHG

## 2021-12-15 DIAGNOSIS — M79.18 MYOFASCIAL PAIN ON LEFT SIDE: Primary | ICD-10-CM

## 2021-12-15 DIAGNOSIS — I44.1 SECOND DEGREE AV BLOCK, MOBITZ TYPE I: ICD-10-CM

## 2021-12-15 LAB
ALBUMIN SERPL-MCNC: 4.5 G/DL (ref 3.5–5.2)
ALBUMIN/GLOB SERPL: 1.8 G/DL
ALP SERPL-CCNC: 71 U/L (ref 39–117)
ALT SERPL W P-5'-P-CCNC: 17 U/L (ref 1–41)
ANION GAP SERPL CALCULATED.3IONS-SCNC: 7 MMOL/L (ref 5–15)
AST SERPL-CCNC: 15 U/L (ref 1–40)
BASOPHILS # BLD AUTO: 0.01 10*3/MM3 (ref 0–0.2)
BASOPHILS NFR BLD AUTO: 0.3 % (ref 0–1.5)
BILIRUB SERPL-MCNC: 1.1 MG/DL (ref 0–1.2)
BUN SERPL-MCNC: 12 MG/DL (ref 6–20)
BUN/CREAT SERPL: 11 (ref 7–25)
CALCIUM SPEC-SCNC: 9.6 MG/DL (ref 8.6–10.5)
CHLORIDE SERPL-SCNC: 102 MMOL/L (ref 98–107)
CK MB SERPL-CCNC: 1.43 NG/ML
CK SERPL-CCNC: 120 U/L (ref 20–200)
CO2 SERPL-SCNC: 27 MMOL/L (ref 22–29)
CREAT SERPL-MCNC: 1.09 MG/DL (ref 0.76–1.27)
DEPRECATED RDW RBC AUTO: 40.1 FL (ref 37–54)
EOSINOPHIL # BLD AUTO: 0.02 10*3/MM3 (ref 0–0.4)
EOSINOPHIL NFR BLD AUTO: 0.6 % (ref 0.3–6.2)
ERYTHROCYTE [DISTWIDTH] IN BLOOD BY AUTOMATED COUNT: 13.5 % (ref 12.3–15.4)
GFR SERPL CREATININE-BSD FRML MDRD: 90 ML/MIN/1.73
GLOBULIN UR ELPH-MCNC: 2.5 GM/DL
GLUCOSE SERPL-MCNC: 94 MG/DL (ref 65–99)
HCT VFR BLD AUTO: 47.9 % (ref 37.5–51)
HGB BLD-MCNC: 16 G/DL (ref 13–17.7)
HOLD SPECIMEN: NORMAL
HOLD SPECIMEN: NORMAL
IMM GRANULOCYTES # BLD AUTO: 0.01 10*3/MM3 (ref 0–0.05)
IMM GRANULOCYTES NFR BLD AUTO: 0.3 % (ref 0–0.5)
LIPASE SERPL-CCNC: 22 U/L (ref 13–60)
LYMPHOCYTES # BLD AUTO: 1.1 10*3/MM3 (ref 0.7–3.1)
LYMPHOCYTES NFR BLD AUTO: 33.3 % (ref 19.6–45.3)
MAGNESIUM SERPL-MCNC: 2.2 MG/DL (ref 1.6–2.6)
MCH RBC QN AUTO: 27.4 PG (ref 26.6–33)
MCHC RBC AUTO-ENTMCNC: 33.4 G/DL (ref 31.5–35.7)
MCV RBC AUTO: 82.2 FL (ref 79–97)
MONOCYTES # BLD AUTO: 0.23 10*3/MM3 (ref 0.1–0.9)
MONOCYTES NFR BLD AUTO: 7 % (ref 5–12)
NEUTROPHILS NFR BLD AUTO: 1.93 10*3/MM3 (ref 1.7–7)
NEUTROPHILS NFR BLD AUTO: 58.5 % (ref 42.7–76)
NRBC BLD AUTO-RTO: 0 /100 WBC (ref 0–0.2)
NT-PROBNP SERPL-MCNC: 10.7 PG/ML (ref 0–450)
PLATELET # BLD AUTO: 165 10*3/MM3 (ref 140–450)
PMV BLD AUTO: 9.5 FL (ref 6–12)
POTASSIUM SERPL-SCNC: 4.2 MMOL/L (ref 3.5–5.2)
PROT SERPL-MCNC: 7 G/DL (ref 6–8.5)
QT INTERVAL: 404 MS
RBC # BLD AUTO: 5.83 10*6/MM3 (ref 4.14–5.8)
SODIUM SERPL-SCNC: 136 MMOL/L (ref 136–145)
TROPONIN I SERPL-MCNC: 0 NG/ML (ref 0–0.6)
TROPONIN I SERPL-MCNC: 0.01 NG/ML (ref 0–0.6)
WBC NRBC COR # BLD: 3.3 10*3/MM3 (ref 3.4–10.8)
WHOLE BLOOD HOLD SPECIMEN: NORMAL
WHOLE BLOOD HOLD SPECIMEN: NORMAL

## 2021-12-15 PROCEDURE — 93005 ELECTROCARDIOGRAM TRACING: CPT

## 2021-12-15 PROCEDURE — 36415 COLL VENOUS BLD VENIPUNCTURE: CPT

## 2021-12-15 PROCEDURE — 80053 COMPREHEN METABOLIC PANEL: CPT

## 2021-12-15 PROCEDURE — 82550 ASSAY OF CK (CPK): CPT

## 2021-12-15 PROCEDURE — 71045 X-RAY EXAM CHEST 1 VIEW: CPT

## 2021-12-15 PROCEDURE — 85025 COMPLETE CBC W/AUTO DIFF WBC: CPT

## 2021-12-15 PROCEDURE — 83690 ASSAY OF LIPASE: CPT

## 2021-12-15 PROCEDURE — 83735 ASSAY OF MAGNESIUM: CPT

## 2021-12-15 PROCEDURE — 93010 ELECTROCARDIOGRAM REPORT: CPT | Performed by: INTERNAL MEDICINE

## 2021-12-15 PROCEDURE — 96374 THER/PROPH/DIAG INJ IV PUSH: CPT

## 2021-12-15 PROCEDURE — 84484 ASSAY OF TROPONIN QUANT: CPT

## 2021-12-15 PROCEDURE — 82553 CREATINE MB FRACTION: CPT

## 2021-12-15 PROCEDURE — 83880 ASSAY OF NATRIURETIC PEPTIDE: CPT

## 2021-12-15 PROCEDURE — 25010000002 KETOROLAC TROMETHAMINE PER 15 MG: Performed by: EMERGENCY MEDICINE

## 2021-12-15 PROCEDURE — 93005 ELECTROCARDIOGRAM TRACING: CPT | Performed by: EMERGENCY MEDICINE

## 2021-12-15 PROCEDURE — 99283 EMERGENCY DEPT VISIT LOW MDM: CPT

## 2021-12-15 RX ORDER — SODIUM CHLORIDE 0.9 % (FLUSH) 0.9 %
10 SYRINGE (ML) INJECTION AS NEEDED
Status: DISCONTINUED | OUTPATIENT
Start: 2021-12-15 | End: 2021-12-15 | Stop reason: HOSPADM

## 2021-12-15 RX ORDER — KETOROLAC TROMETHAMINE 30 MG/ML
30 INJECTION, SOLUTION INTRAMUSCULAR; INTRAVENOUS ONCE
Status: COMPLETED | OUTPATIENT
Start: 2021-12-15 | End: 2021-12-15

## 2021-12-15 RX ORDER — ASPIRIN 81 MG/1
324 TABLET, CHEWABLE ORAL ONCE
Status: COMPLETED | OUTPATIENT
Start: 2021-12-15 | End: 2021-12-15

## 2021-12-15 RX ORDER — NAPROXEN 500 MG/1
500 TABLET ORAL 2 TIMES DAILY WITH MEALS
Qty: 20 TABLET | Refills: 0 | Status: SHIPPED | OUTPATIENT
Start: 2021-12-15 | End: 2022-04-14

## 2021-12-15 RX ADMIN — ASPIRIN 324 MG: 81 TABLET, CHEWABLE ORAL at 10:13

## 2021-12-15 RX ADMIN — KETOROLAC TROMETHAMINE 30 MG: 30 INJECTION, SOLUTION INTRAMUSCULAR; INTRAVENOUS at 11:23

## 2021-12-15 NOTE — ED PROVIDER NOTES
Time: 12:51 EST  Arrived by: GABRIELLA  Chief Complaint: back pain, chest pain  History provided by: pt  History is limited by: N/A    History of Present Illness:    Humphrey Iglesias is a 41 y.o. male who presents to the emergency department today with complaints of back pain that began on Monday. Pt states he started a new part time job that requires him to bend over quite a bit--thus originally thinking that was the cause of his sx. However this morning pain began radiating from his back around to his chest. He complains of shortness of breath secondary to the pain. He denies diaphoresis, chills, nausea, emesis, neck pain, jaw pain, arm pain, or any other pertinent sx or concerns.       History provided by:  Patient   used: No      Past Medical History:     No Known Allergies  Past Medical History:   Diagnosis Date   • Allergic    • Allergic rhinitis      History reviewed. No pertinent surgical history.  Family History   Problem Relation Age of Onset   • Diabetes Mother         unspecified type   • Diabetes Father         unspecified type       Home Medications:  Prior to Admission medications    Medication Sig Start Date End Date Taking? Authorizing Provider   Cetirizine HCl (ZyrTEC Allergy) 10 MG capsule     Provider, MD Jack   loratadine (Claritin) 10 MG tablet Claritin 10 mg oral tablet take 1 tablet by oral route daily as needed   Active    Provider, MD Jack   vitamin D (ERGOCALCIFEROL) 1.25 MG (77350 UT) capsule capsule Take 1 capsule by mouth Every 7 (Seven) Days. 10/29/21   Norbert Blount APRN        Social History:   PT  reports that he quit smoking about 19 years ago. He has a 0.13 pack-year smoking history. He has never used smokeless tobacco. He reports current alcohol use. He reports previous drug use.    Record Review:  I have reviewed the patient's records in [employer].     Review of Systems  Review of Systems   Constitutional: Negative for chills and fever.   HENT: Negative  "for congestion, rhinorrhea and sore throat.    Eyes: Negative for pain and visual disturbance.   Respiratory: Positive for shortness of breath (with pain). Negative for apnea, cough and chest tightness.    Cardiovascular: Positive for chest pain. Negative for palpitations.   Gastrointestinal: Negative for abdominal pain, diarrhea, nausea and vomiting.   Genitourinary: Negative for difficulty urinating and dysuria.   Musculoskeletal: Positive for back pain. Negative for joint swelling and myalgias.   Skin: Negative for color change.   Neurological: Negative for seizures and headaches.   Psychiatric/Behavioral: Negative.    All other systems reviewed and are negative.       Physical Exam  /73   Pulse 76   Temp 98.1 °F (36.7 °C)   Resp 16   Ht 188 cm (74\")   Wt 109 kg (240 lb)   SpO2 95%   BMI 30.81 kg/m²     Physical Exam  Vitals and nursing note reviewed.   Constitutional:       General: He is not in acute distress.     Appearance: Normal appearance. He is not toxic-appearing.   HENT:      Head: Normocephalic and atraumatic.      Jaw: There is normal jaw occlusion.   Eyes:      General: Lids are normal.      Extraocular Movements: Extraocular movements intact.      Conjunctiva/sclera: Conjunctivae normal.      Pupils: Pupils are equal, round, and reactive to light.   Cardiovascular:      Rate and Rhythm: Normal rate and regular rhythm.      Pulses: Normal pulses.      Heart sounds: Normal heart sounds.   Pulmonary:      Effort: Pulmonary effort is normal. No respiratory distress.      Breath sounds: Normal breath sounds. No wheezing or rhonchi.   Abdominal:      General: Abdomen is flat.      Palpations: Abdomen is soft.      Tenderness: There is no abdominal tenderness. There is no guarding or rebound.   Musculoskeletal:         General: Normal range of motion.      Cervical back: Normal range of motion and neck supple.      Right lower leg: No edema.      Left lower leg: No edema.      Comments: " "Tenderness with palpation over perithoracic muscles    Skin:     General: Skin is warm and dry.   Neurological:      Mental Status: He is alert and oriented to person, place, and time. Mental status is at baseline.   Psychiatric:         Mood and Affect: Mood normal.                  ED Course  /73   Pulse 76   Temp 98.1 °F (36.7 °C)   Resp 16   Ht 188 cm (74\")   Wt 109 kg (240 lb)   SpO2 95%   BMI 30.81 kg/m²   Results for orders placed or performed during the hospital encounter of 12/15/21   Comprehensive Metabolic Panel    Specimen: Arm, Right; Blood   Result Value Ref Range    Glucose 94 65 - 99 mg/dL    BUN 12 6 - 20 mg/dL    Creatinine 1.09 0.76 - 1.27 mg/dL    Sodium 136 136 - 145 mmol/L    Potassium 4.2 3.5 - 5.2 mmol/L    Chloride 102 98 - 107 mmol/L    CO2 27.0 22.0 - 29.0 mmol/L    Calcium 9.6 8.6 - 10.5 mg/dL    Total Protein 7.0 6.0 - 8.5 g/dL    Albumin 4.50 3.50 - 5.20 g/dL    ALT (SGPT) 17 1 - 41 U/L    AST (SGOT) 15 1 - 40 U/L    Alkaline Phosphatase 71 39 - 117 U/L    Total Bilirubin 1.1 0.0 - 1.2 mg/dL    eGFR  African Amer 90 >60 mL/min/1.73    Globulin 2.5 gm/dL    A/G Ratio 1.8 g/dL    BUN/Creatinine Ratio 11.0 7.0 - 25.0    Anion Gap 7.0 5.0 - 15.0 mmol/L   Lipase    Specimen: Arm, Right; Blood   Result Value Ref Range    Lipase 22 13 - 60 U/L   BNP    Specimen: Arm, Right; Blood   Result Value Ref Range    proBNP 10.7 0.0 - 450.0 pg/mL   Magnesium    Specimen: Arm, Right; Blood   Result Value Ref Range    Magnesium 2.2 1.6 - 2.6 mg/dL   CK Total & CKMB    Specimen: Arm, Right; Blood   Result Value Ref Range    CKMB 1.43 <=10.40 ng/mL    Creatine Kinase 120 20 - 200 U/L   CBC Auto Differential    Specimen: Arm, Right; Blood   Result Value Ref Range    WBC 3.30 (L) 3.40 - 10.80 10*3/mm3    RBC 5.83 (H) 4.14 - 5.80 10*6/mm3    Hemoglobin 16.0 13.0 - 17.7 g/dL    Hematocrit 47.9 37.5 - 51.0 %    MCV 82.2 79.0 - 97.0 fL    MCH 27.4 26.6 - 33.0 pg    MCHC 33.4 31.5 - 35.7 g/dL    RDW " 13.5 12.3 - 15.4 %    RDW-SD 40.1 37.0 - 54.0 fl    MPV 9.5 6.0 - 12.0 fL    Platelets 165 140 - 450 10*3/mm3    Neutrophil % 58.5 42.7 - 76.0 %    Lymphocyte % 33.3 19.6 - 45.3 %    Monocyte % 7.0 5.0 - 12.0 %    Eosinophil % 0.6 0.3 - 6.2 %    Basophil % 0.3 0.0 - 1.5 %    Immature Grans % 0.3 0.0 - 0.5 %    Neutrophils, Absolute 1.93 1.70 - 7.00 10*3/mm3    Lymphocytes, Absolute 1.10 0.70 - 3.10 10*3/mm3    Monocytes, Absolute 0.23 0.10 - 0.90 10*3/mm3    Eosinophils, Absolute 0.02 0.00 - 0.40 10*3/mm3    Basophils, Absolute 0.01 0.00 - 0.20 10*3/mm3    Immature Grans, Absolute 0.01 0.00 - 0.05 10*3/mm3    nRBC 0.0 0.0 - 0.2 /100 WBC   POC Troponin I    Specimen: Blood   Result Value Ref Range    Troponin I 0.01 0.00 - 0.60 ng/mL   POC Troponin I    Specimen: Blood   Result Value Ref Range    Troponin I 0.00 0.00 - 0.60 ng/mL   ECG 12 Lead   Result Value Ref Range    QT Interval 404 ms   ECG 12 Lead   Result Value Ref Range    QT Interval 414 ms   Green Top (Gel)   Result Value Ref Range    Extra Tube Hold for add-ons.    Lavender Top   Result Value Ref Range    Extra Tube hold for add-on    Gold Top - SST   Result Value Ref Range    Extra Tube Hold for add-ons.    Light Blue Top   Result Value Ref Range    Extra Tube hold for add-on      Medications   sodium chloride 0.9 % flush 10 mL (has no administration in time range)   aspirin chewable tablet 324 mg (324 mg Oral Given 12/15/21 1013)   ketorolac (TORADOL) injection 30 mg (30 mg Intravenous Given 12/15/21 1123)     XR Chest 1 View    Result Date: 12/15/2021  Narrative: PROCEDURE: XR CHEST 1 VW  COMPARISON: [facility], , CHEST AP/PA 1 VIEW, 10/14/2020, 22:17.  INDICATIONS: MID Chest Pain  FINDINGS:   The lungs are well-expanded. The heart and pulmonary vasculature are within normal limits. No pleural effusions are identified. There are no active appearing infiltrates.  No evidence of pneumothorax.  IMPRESSION: No active disease.  KITTY OBRIEN  MD ARLENE       Electronically Signed and Approved By: KITTY JACOBS MD on 12/15/2021 at 10:07               Procedures/EKGs:  Procedures  EKG performed at 941 was turned by me to show a sinus bradycardia with ventricular rate 55 bpm.  Patient is known to have a second-degree type I (wenckebach) block.  Forrest is leftward at -12 degrees.  There are no acute ischemic ST or T wave change identified.  QT corrected is normal 436 ms.  This EKG was compared with one dated 10/14/2020 and is changed in regards to the type II block.  Patient that time had a first-degree AV block.    EKG performed 1132 was inter by me to show a normal sinus rhythm with a ventricular rate of 64 beats minute.  Again patient has a second-degree type I block.  Axis is leftward -20 degrees.  There is no acute ischemic ST or to change identified.  QT corrected is normal 420 ms.    Medical Decision Making:                     Patient was seen evaluated ED by me.  The above history and physical examination was performed as document.  The diagnostic data is obtained.  Results reviewed.  Discussed with the patient.  I did review the case with Dr. Ernesto Boston.  Patient be discharged home with outpatient follow-up if he has ongoing or changing of his symptoms.  Patient also be placed on an NSAID.    MDM     Final diagnoses:   Myofascial pain on left side   Second degree AV block, Mobitz type I          Disposition:  ED Disposition     ED Disposition Condition Comment    Discharge Stable           Documentation assistance provided by Aris Osborn DO acting as scribe for Aris Osborn DO. Information recorded by the scribe was done at my direction and has been verified and validated by me.        Netta Villafana  12/15/21 1025       Aris Osborn DO  12/15/21 1251

## 2021-12-15 NOTE — DISCHARGE INSTRUCTIONS
Activity as tolerated.  Take prescription as directed for pain.  Call Dr. Ernesto Boston's office for an appointment after a week of treatment if your pain is not improving or if you develop any other signs or symptoms or concerns.  Return to the ER also for change or worsening chest pain, shortness of breath, fever or any other concerns issues that may arise.

## 2021-12-21 LAB — QT INTERVAL: 414 MS

## 2022-04-14 ENCOUNTER — OFFICE VISIT (OUTPATIENT)
Dept: FAMILY MEDICINE CLINIC | Facility: CLINIC | Age: 42
End: 2022-04-14

## 2022-04-14 VITALS
BODY MASS INDEX: 32.21 KG/M2 | HEIGHT: 74 IN | SYSTOLIC BLOOD PRESSURE: 122 MMHG | TEMPERATURE: 96.7 F | HEART RATE: 73 BPM | DIASTOLIC BLOOD PRESSURE: 68 MMHG | OXYGEN SATURATION: 100 % | WEIGHT: 251 LBS

## 2022-04-14 DIAGNOSIS — Z86.79 HISTORY OF SECOND DEGREE HEART BLOCK: ICD-10-CM

## 2022-04-14 DIAGNOSIS — E78.00 PURE HYPERCHOLESTEROLEMIA: ICD-10-CM

## 2022-04-14 DIAGNOSIS — K92.1 BLOOD IN STOOL: Primary | ICD-10-CM

## 2022-04-14 DIAGNOSIS — R07.9 CHEST PAIN, UNSPECIFIED TYPE: ICD-10-CM

## 2022-04-14 DIAGNOSIS — K64.8 INTERNAL HEMORRHOIDS: ICD-10-CM

## 2022-04-14 DIAGNOSIS — I44.0 FIRST DEGREE AV BLOCK: ICD-10-CM

## 2022-04-14 PROCEDURE — 80061 LIPID PANEL: CPT | Performed by: NURSE PRACTITIONER

## 2022-04-14 PROCEDURE — 84100 ASSAY OF PHOSPHORUS: CPT | Performed by: NURSE PRACTITIONER

## 2022-04-14 PROCEDURE — 99214 OFFICE O/P EST MOD 30 MIN: CPT | Performed by: NURSE PRACTITIONER

## 2022-04-14 PROCEDURE — 83735 ASSAY OF MAGNESIUM: CPT | Performed by: NURSE PRACTITIONER

## 2022-04-14 PROCEDURE — 93000 ELECTROCARDIOGRAM COMPLETE: CPT | Performed by: NURSE PRACTITIONER

## 2022-04-14 PROCEDURE — 80050 GENERAL HEALTH PANEL: CPT | Performed by: NURSE PRACTITIONER

## 2022-04-14 RX ORDER — HYDROCORTISONE ACETATE 25 MG/1
25 SUPPOSITORY RECTAL 2 TIMES DAILY
Qty: 24 EACH | Refills: 2 | Status: SHIPPED | OUTPATIENT
Start: 2022-04-14 | End: 2022-07-22

## 2022-04-14 NOTE — PROGRESS NOTES
"Chief Complaint  Rectal Bleeding, chest pain    Subjective          Humphrey Iglesias presents to [facility]  History of Present Illness  Resents today for an acute visit for blood in stool.  He reports approximately 1.5 weeks ago that he began having bright red blood with his stool.  He notices it with each bowel movement.  Denies abdominal pain, fatigue, black tarry stool, constipation, and anal pain or irritation.  He states he has a history of hemorrhoids in the past.  Approximately 1 to 2 months ago he had an episode of blood in stool but resolved quickly.    He also reports having intermittent chest pain.  Pain is substernal and on the right side.  Denies any triggers or aggravating causing the chest pain.  Denies palpitations, syncope, headaches.  Occasionally will feel lightheaded but not with chest pain.  States the pain is dull sometimes sharp last 5 minutes.  Last time he had chest pain was 1 week ago.    Objective   Vital Signs:   /68   Pulse 73   Temp 96.7 °F (35.9 °C)   Ht 188 cm (74\")   Wt 114 kg (251 lb)   SpO2 100%   BMI 32.23 kg/m²            Physical Exam  Vitals reviewed.   Constitutional:       Appearance: Normal appearance. He is well-developed.   HENT:      Head: Normocephalic and atraumatic.      Right Ear: External ear normal.      Left Ear: External ear normal.      Mouth/Throat:      Pharynx: No oropharyngeal exudate.   Eyes:      Conjunctiva/sclera: Conjunctivae normal.      Pupils: Pupils are equal, round, and reactive to light.   Cardiovascular:      Rate and Rhythm: Normal rate and regular rhythm.      Heart sounds: No murmur heard.    No friction rub. No gallop.   Pulmonary:      Effort: Pulmonary effort is normal.      Breath sounds: Normal breath sounds. No wheezing or rhonchi.   Abdominal:      General: Bowel sounds are normal. There is no distension.      Palpations: Abdomen is soft.      Tenderness: There is no abdominal tenderness. "   Genitourinary:     Rectum: No anal fissure or external hemorrhoid.   Skin:     General: Skin is warm and dry.   Neurological:      Mental Status: He is alert and oriented to person, place, and time.        Result Review :            ECG 12 Lead    Date/Time: 4/14/2022 1:31 PM  Performed by: Norbert Blount APRN  Authorized by: Norbert Blount APRN   Comparison: compared with previous ECG from 12/15/2021  Comparison to previous ECG: Previous EKG second-degree AV block, current EKG was sinus rhythm  Rhythm: sinus rhythm  Rate: normal  BPM: 70  Conduction: conduction normal  Conduction: 1st degree AV block  ST Segments: ST segments normal  T Waves: T waves normal  QRS axis: normal    Clinical impression: abnormal EKG  Comments: Sinus rhythm, rate 70, , QRSD 106, , QTcB 440, axis P 15, QRS 3, T10              Assessment and Plan    Diagnoses and all orders for this visit:    1. Blood in stool (Primary)  -     CBC & Differential    2. Chest pain, unspecified type  -     Comprehensive Metabolic Panel  -     TSH Rfx On Abnormal To Free T4  -     Magnesium  -     Phosphorus  -     Ambulatory Referral to Cardiology    3. Pure hypercholesterolemia  -     Lipid Panel    4. Internal hemorrhoids  -     hydrocortisone (ANUSOL-HC) 25 MG suppository; Insert 1 suppository into the rectum 2 (Two) Times a Day.  Dispense: 24 each; Refill: 2  -     hydrocortisone 2.5 % cream; Apply 1 application topically to the appropriate area as directed 2 (Two) Times a Day.  Dispense: 28 g; Refill: 2    5. First degree AV block  -     Ambulatory Referral to Cardiology    6. History of second degree heart block  -     Ambulatory Referral to Cardiology    Other orders  -     ECG 12 Lead    Will prescribe hydrocortisone cream with hydrocortisone suppositories twice daily for the next 2 weeks.  Follow-up next week to reevaluate.  If he continues to have blood in stools will consult general surgery for colonoscopy.  In regards to the  chest pain, EKG in office was within normal limits.  Check the following labs CBC CMP TSH mag and phos.      Follow Up   Return in about 1 week (around 4/21/2022), or if symptoms worsen or fail to improve, for Next scheduled follow up.  Patient was given instructions and counseling regarding his condition or for health maintenance advice. Please see specific information pulled into the AVS if appropriate.

## 2022-04-15 LAB
ALBUMIN SERPL-MCNC: 4.6 G/DL (ref 3.5–5.2)
ALBUMIN/GLOB SERPL: 1.7 G/DL
ALP SERPL-CCNC: 72 U/L (ref 39–117)
ALT SERPL W P-5'-P-CCNC: 21 U/L (ref 1–41)
ANION GAP SERPL CALCULATED.3IONS-SCNC: 13 MMOL/L (ref 5–15)
AST SERPL-CCNC: 19 U/L (ref 1–40)
BASOPHILS # BLD AUTO: 0.03 10*3/MM3 (ref 0–0.2)
BASOPHILS NFR BLD AUTO: 0.9 % (ref 0–1.5)
BILIRUB SERPL-MCNC: 1 MG/DL (ref 0–1.2)
BUN SERPL-MCNC: 13 MG/DL (ref 6–20)
BUN/CREAT SERPL: 12.5 (ref 7–25)
CALCIUM SPEC-SCNC: 9.4 MG/DL (ref 8.6–10.5)
CHLORIDE SERPL-SCNC: 102 MMOL/L (ref 98–107)
CHOLEST SERPL-MCNC: 230 MG/DL (ref 0–200)
CO2 SERPL-SCNC: 24 MMOL/L (ref 22–29)
CREAT SERPL-MCNC: 1.04 MG/DL (ref 0.76–1.27)
DEPRECATED RDW RBC AUTO: 35.6 FL (ref 37–54)
EGFRCR SERPLBLD CKD-EPI 2021: 92.5 ML/MIN/1.73
EOSINOPHIL # BLD AUTO: 0.03 10*3/MM3 (ref 0–0.4)
EOSINOPHIL NFR BLD AUTO: 0.9 % (ref 0.3–6.2)
ERYTHROCYTE [DISTWIDTH] IN BLOOD BY AUTOMATED COUNT: 12.6 % (ref 12.3–15.4)
GLOBULIN UR ELPH-MCNC: 2.7 GM/DL
GLUCOSE SERPL-MCNC: 74 MG/DL (ref 65–99)
HCT VFR BLD AUTO: 50 % (ref 37.5–51)
HDLC SERPL-MCNC: 53 MG/DL (ref 40–60)
HGB BLD-MCNC: 16.9 G/DL (ref 13–17.7)
IMM GRANULOCYTES # BLD AUTO: 0.01 10*3/MM3 (ref 0–0.05)
IMM GRANULOCYTES NFR BLD AUTO: 0.3 % (ref 0–0.5)
LDLC SERPL CALC-MCNC: 170 MG/DL (ref 0–100)
LDLC/HDLC SERPL: 3.17 {RATIO}
LYMPHOCYTES # BLD AUTO: 1.46 10*3/MM3 (ref 0.7–3.1)
LYMPHOCYTES NFR BLD AUTO: 42.7 % (ref 19.6–45.3)
MAGNESIUM SERPL-MCNC: 2.2 MG/DL (ref 1.6–2.6)
MCH RBC QN AUTO: 27.2 PG (ref 26.6–33)
MCHC RBC AUTO-ENTMCNC: 33.8 G/DL (ref 31.5–35.7)
MCV RBC AUTO: 80.4 FL (ref 79–97)
MONOCYTES # BLD AUTO: 0.31 10*3/MM3 (ref 0.1–0.9)
MONOCYTES NFR BLD AUTO: 9.1 % (ref 5–12)
NEUTROPHILS NFR BLD AUTO: 1.58 10*3/MM3 (ref 1.7–7)
NEUTROPHILS NFR BLD AUTO: 46.1 % (ref 42.7–76)
NRBC BLD AUTO-RTO: 0 /100 WBC (ref 0–0.2)
PHOSPHATE SERPL-MCNC: 3.5 MG/DL (ref 2.5–4.5)
PLATELET # BLD AUTO: 197 10*3/MM3 (ref 140–450)
PMV BLD AUTO: 10.3 FL (ref 6–12)
POTASSIUM SERPL-SCNC: 4.1 MMOL/L (ref 3.5–5.2)
PROT SERPL-MCNC: 7.3 G/DL (ref 6–8.5)
RBC # BLD AUTO: 6.22 10*6/MM3 (ref 4.14–5.8)
SODIUM SERPL-SCNC: 139 MMOL/L (ref 136–145)
TRIGL SERPL-MCNC: 46 MG/DL (ref 0–150)
TSH SERPL DL<=0.05 MIU/L-ACNC: 2.11 UIU/ML (ref 0.27–4.2)
VLDLC SERPL-MCNC: 7 MG/DL (ref 5–40)
WBC NRBC COR # BLD: 3.42 10*3/MM3 (ref 3.4–10.8)

## 2022-04-21 ENCOUNTER — OFFICE VISIT (OUTPATIENT)
Dept: FAMILY MEDICINE CLINIC | Facility: CLINIC | Age: 42
End: 2022-04-21

## 2022-04-21 VITALS
SYSTOLIC BLOOD PRESSURE: 122 MMHG | BODY MASS INDEX: 31.93 KG/M2 | OXYGEN SATURATION: 98 % | DIASTOLIC BLOOD PRESSURE: 58 MMHG | HEART RATE: 87 BPM | WEIGHT: 248.8 LBS | HEIGHT: 74 IN | TEMPERATURE: 95.8 F

## 2022-04-21 DIAGNOSIS — I44.0 FIRST DEGREE AV BLOCK: ICD-10-CM

## 2022-04-21 DIAGNOSIS — E78.00 PURE HYPERCHOLESTEROLEMIA: ICD-10-CM

## 2022-04-21 DIAGNOSIS — K64.8 INTERNAL HEMORRHOIDS: Primary | ICD-10-CM

## 2022-04-21 PROCEDURE — 99213 OFFICE O/P EST LOW 20 MIN: CPT | Performed by: NURSE PRACTITIONER

## 2022-04-21 NOTE — PROGRESS NOTES
"Chief Complaint  Chest Pain and Rectal Bleeding    Subjective          Humphrey Iglesias presents to [facility]  History of Present Illness  Presents today for follow-up on internal hemorrhoids rectal bleeding and chest pain.  He has not had any chest pain in the past week.  He was noted to have a first-degree AV block on his EKG.  Previous EKG in December he had a second-degree AV block.  He has an appoint with Dr. Grider on May 6.    Rectal bleeding stopped after 4 days of treatment of hydrocortisone suppositories.  States he is having soft stools.    Reviewed labs with patient.  He was noted to have elevated cholesterol.  LDL is high at 170.    Objective   Vital Signs:   /58   Pulse 87   Temp 95.8 °F (35.4 °C)   Ht 188 cm (74\")   Wt 113 kg (248 lb 12.8 oz)   SpO2 98%   BMI 31.94 kg/m²            Physical Exam  Vitals reviewed.   Constitutional:       Appearance: Normal appearance. He is well-developed.   HENT:      Head: Normocephalic and atraumatic.      Right Ear: External ear normal.      Left Ear: External ear normal.      Mouth/Throat:      Pharynx: No oropharyngeal exudate.   Eyes:      Conjunctiva/sclera: Conjunctivae normal.      Pupils: Pupils are equal, round, and reactive to light.   Cardiovascular:      Rate and Rhythm: Normal rate and regular rhythm.      Heart sounds: No murmur heard.    No friction rub. No gallop.   Pulmonary:      Effort: Pulmonary effort is normal.      Breath sounds: Normal breath sounds. No wheezing or rhonchi.   Abdominal:      General: Bowel sounds are normal. There is no distension.      Palpations: Abdomen is soft.      Tenderness: There is no abdominal tenderness.   Skin:     General: Skin is warm and dry.   Neurological:      Mental Status: He is alert and oriented to person, place, and time.   Psychiatric:         Mood and Affect: Mood and affect normal.         Behavior: Behavior normal.         Thought Content: Thought " content normal.         Judgment: Judgment normal.        Result Review :     Common labs    Common Labsle 12/15/21 12/15/21 4/14/22 4/14/22 4/14/22    0938 0938 1303 1303 1303   Glucose  94  74    BUN  12  13    Creatinine  1.09  1.04    eGFR African Am  90      Sodium  136  139    Potassium  4.2  4.1    Chloride  102  102    Calcium  9.6  9.4    Albumin  4.50  4.60    Total Bilirubin  1.1  1.0    Alkaline Phosphatase  71  72    AST (SGOT)  15  19    ALT (SGPT)  17  21    WBC 3.30 (A)  3.42     Hemoglobin 16.0  16.9     Hematocrit 47.9  50.0     Platelets 165  197     Total Cholesterol     230 (A)   Triglycerides     46   HDL Cholesterol     53   LDL Cholesterol      170 (A)   (A) Abnormal value                      Assessment and Plan    Diagnoses and all orders for this visit:    1. Internal hemorrhoids (Primary)  Comments:  Internal hemorrhoids are improving.  No rectal bleeding.  Discussed high-fiber diet.  May take stool softeners as needed.  No prolonged sitting on toilet    2. Pure hypercholesterolemia  Assessment & Plan:  Lipid abnormalities are newly identified.  Nutritional counseling was provided.  Lipids will be reassessed in 3 months.      3. First degree AV block  Comments:  Has an appoint with cardiologist for further evaluation and treatment.      Follow Up   Return in about 3 months (around 7/21/2022), or if symptoms worsen or fail to improve, for Next scheduled follow up.  Patient was given instructions and counseling regarding his condition or for health maintenance advice. Please see specific information pulled into the AVS if appropriate.

## 2022-05-06 ENCOUNTER — OFFICE VISIT (OUTPATIENT)
Dept: CARDIOLOGY | Facility: CLINIC | Age: 42
End: 2022-05-06

## 2022-05-06 VITALS
SYSTOLIC BLOOD PRESSURE: 127 MMHG | HEIGHT: 74 IN | BODY MASS INDEX: 32.21 KG/M2 | DIASTOLIC BLOOD PRESSURE: 82 MMHG | HEART RATE: 72 BPM | WEIGHT: 251 LBS

## 2022-05-06 DIAGNOSIS — R07.89 CHEST PAIN, ATYPICAL: Primary | ICD-10-CM

## 2022-05-06 PROCEDURE — 99214 OFFICE O/P EST MOD 30 MIN: CPT | Performed by: INTERNAL MEDICINE

## 2022-05-06 RX ORDER — PANTOPRAZOLE SODIUM 20 MG/1
20 TABLET, DELAYED RELEASE ORAL DAILY
Qty: 90 TABLET | Refills: 2 | Status: SHIPPED | OUTPATIENT
Start: 2022-05-06

## 2022-05-06 NOTE — PROGRESS NOTES
Chief Complaint  Chest Pain, 1st Degree AV Block , and Hx of 2nd Degree Heart Block     Subjective            Humphrey Iglesias presents to [facility]  History of Present Illness    41-year-old -American male here for follow-up evaluation management of chest pain.  I last saw Mr. Iglesias back in .  He had some atypical chest pain at that point and treadmill stress echo was performed which was low risk.  He has had some intermittent chest pain recently, sometimes sharp, sometimes dull, sometimes feels like heartburn and random in occurrence.  Sometimes brief sometimes lasting up to 15 minutes duration.  It is not exertional.  No other cardiac symptoms.    PMH  Past Medical History:   Diagnosis Date   • Allergic    • Allergic rhinitis          SURGICALHX  History reviewed. No pertinent surgical history.     SOC  Social History     Socioeconomic History   • Marital status:    Tobacco Use   • Smoking status: Former Smoker     Packs/day: 0.25     Years: 0.50     Pack years: 0.12     Quit date:      Years since quittin.3   • Smokeless tobacco: Never Used   Vaping Use   • Vaping Use: Never used   Substance and Sexual Activity   • Alcohol use: Yes     Comment: occasionally drinks, less than 1 drink per day, has been drinking for 3 years   • Drug use: Not Currently     Comment: former in the past   • Sexual activity: Defer         FAMHX  Family History   Problem Relation Age of Onset   • Diabetes Mother         unspecified type   • Diabetes Father         unspecified type          ALLERGY  No Known Allergies     MEDSCURRENT    Current Outpatient Medications:   •  Cetirizine HCl (ZyrTEC Allergy) 10 MG capsule, , Disp: , Rfl:   •  hydrocortisone (ANUSOL-HC) 25 MG suppository, Insert 1 suppository into the rectum 2 (Two) Times a Day., Disp: 24 each, Rfl: 2  •  hydrocortisone 2.5 % cream, Apply 1 application topically to the appropriate area as directed 2 (Two) Times a Day.,  "Disp: 28 g, Rfl: 2  •  loratadine (CLARITIN) 10 MG tablet, Claritin 10 mg oral tablet take 1 tablet by oral route daily as needed   Active, Disp: , Rfl:   •  vitamin D (ERGOCALCIFEROL) 1.25 MG (46819 UT) capsule capsule, Take 1 capsule by mouth Every 7 (Seven) Days., Disp: 13 capsule, Rfl: 1  •  pantoprazole (Protonix) 20 MG EC tablet, Take 1 tablet by mouth Daily., Disp: 90 tablet, Rfl: 2      Review of Systems   Constitutional: Negative.   HENT: Negative.    Eyes: Negative.    Cardiovascular: Positive for chest pain.   Respiratory: Negative.    Endocrine: Negative.    Hematologic/Lymphatic: Negative.    Skin: Negative.    Musculoskeletal: Negative.    Gastrointestinal: Negative.    Genitourinary: Negative.    Neurological: Negative.    Psychiatric/Behavioral: Negative.         Objective     /82   Pulse 72   Ht 188 cm (74\")   Wt 114 kg (251 lb)   BMI 32.23 kg/m²       General Appearance:   · well developed  · well nourished  HENT:   · oropharynx moist  · lips not cyanotic  Neck:  · thyroid not enlarged  · supple  Respiratory:  · no respiratory distress  · normal breath sounds  · no rales  Cardiovascular:  · no jugular venous distention  · regular rhythm  · apical impulse normal  · S1 normal, S2 normal  · no S3, no S4   · no murmur  · no rub, no thrill  · carotid pulses normal; no bruit  · pedal pulses normal  · lower extremity edema: none    Musculoskeletal:  · no clubbing of fingers.   · normocephalic, head atraumatic  Skin:   · warm, dry  Psychiatric:  · judgement and insight appropriate  · normal mood and affect      Result Review :     The following data was reviewed by: Maximino Grider MD on 05/06/2022:    CMP    CMP 12/15/21 4/14/22   Glucose 94 74   BUN 12 13   Creatinine 1.09 1.04   eGFR African Am 90    Sodium 136 139   Potassium 4.2 4.1   Chloride 102 102   Calcium 9.6 9.4   Albumin 4.50 4.60   Total Bilirubin 1.1 1.0   Alkaline Phosphatase 71 72   AST (SGOT) 15 19   ALT (SGPT) 17 21 "           CBC    CBC 12/15/21 4/14/22   WBC 3.30 (A) 3.42   RBC 5.83 (A) 6.22 (A)   Hemoglobin 16.0 16.9   Hematocrit 47.9 50.0   MCV 82.2 80.4   MCH 27.4 27.2   MCHC 33.4 33.8   RDW 13.5 12.6   Platelets 165 197   (A) Abnormal value            Lipid Panel    Lipid Panel 4/14/22   Total Cholesterol 230 (A)   Triglycerides 46   HDL Cholesterol 53   VLDL Cholesterol 7   LDL Cholesterol  170 (A)   LDL/HDL Ratio 3.17   (A) Abnormal value            TSH    TSH 4/14/22   TSH 2.110             Data reviewed: Primary care records reviewed, previous cardiac testing reviewed     Procedures         Assessment and Plan        ASSESSMENT:  Encounter Diagnosis   Name Primary?   • Chest pain, atypical Yes         PLAN:    1.  Atypical chest pain, etiology is unclear.  I do not suspect this is a cardiac etiology given his low risk stress testing results in 2020.  I am starting a trial of PPI to see if it may be related to acid reflux.  No additional cardiac work-up is indicated at this time  2.  It has been noted on his previous treadmill test that he has increased vagal tone with first-degree AV block and at times Wenckebach on his baseline EKG.  That resolved with treadmill exercise and is not seen as pathologic at this point  3.  The patient will be followed as needed otherwise, he will call if further problems arise          Patient was given instructions and counseling regarding his condition or for health maintenance advice. Please see specific information pulled into the AVS if appropriate.             STORMY Grider MD  5/6/2022    12:11 EDT

## 2022-05-13 DIAGNOSIS — E55.9 VITAMIN D DEFICIENCY: ICD-10-CM

## 2022-05-13 RX ORDER — ERGOCALCIFEROL 1.25 MG/1
CAPSULE ORAL
Qty: 13 CAPSULE | Refills: 0 | Status: SHIPPED | OUTPATIENT
Start: 2022-05-13 | End: 2022-08-11 | Stop reason: SDUPTHER

## 2022-07-22 ENCOUNTER — OFFICE VISIT (OUTPATIENT)
Dept: FAMILY MEDICINE CLINIC | Facility: CLINIC | Age: 42
End: 2022-07-22

## 2022-07-22 VITALS
SYSTOLIC BLOOD PRESSURE: 118 MMHG | HEIGHT: 74 IN | DIASTOLIC BLOOD PRESSURE: 68 MMHG | TEMPERATURE: 97.9 F | WEIGHT: 256 LBS | HEART RATE: 72 BPM | OXYGEN SATURATION: 98 % | BODY MASS INDEX: 32.85 KG/M2

## 2022-07-22 DIAGNOSIS — R07.89 ATYPICAL CHEST PAIN: Primary | ICD-10-CM

## 2022-07-22 DIAGNOSIS — K21.9 GASTROESOPHAGEAL REFLUX DISEASE WITHOUT ESOPHAGITIS: ICD-10-CM

## 2022-07-22 PROCEDURE — 99212 OFFICE O/P EST SF 10 MIN: CPT | Performed by: NURSE PRACTITIONER

## 2022-07-22 NOTE — PROGRESS NOTES
"Chief Complaint  Follow-up    Subjective        Humphrey Iglesias presents to [facility]  History of Present Illness  Presents today for follow-up.  Since patient had seen cardiology for atypical chest pain.  He was started on pantoprazole 20 mg daily.  Since being on a PPI he has not experienced any chest pain.  Denies syncope, palpitations, headaches, and dizziness.    Objective   Vital Signs:  /68   Pulse 72   Temp 97.9 °F (36.6 °C)   Ht 188 cm (74\")   Wt 116 kg (256 lb)   SpO2 98%   BMI 32.87 kg/m²   Estimated body mass index is 32.87 kg/m² as calculated from the following:    Height as of this encounter: 188 cm (74\").    Weight as of this encounter: 116 kg (256 lb).          Physical Exam  Vitals reviewed.   Constitutional:       Appearance: Normal appearance. He is well-developed.   HENT:      Head: Normocephalic and atraumatic.      Right Ear: External ear normal.      Left Ear: External ear normal.      Mouth/Throat:      Pharynx: No oropharyngeal exudate.   Eyes:      Conjunctiva/sclera: Conjunctivae normal.      Pupils: Pupils are equal, round, and reactive to light.   Cardiovascular:      Rate and Rhythm: Normal rate and regular rhythm.      Heart sounds: No murmur heard.    No friction rub. No gallop.   Pulmonary:      Effort: Pulmonary effort is normal.      Breath sounds: Normal breath sounds. No wheezing or rhonchi.   Abdominal:      General: Bowel sounds are normal. There is no distension.      Palpations: Abdomen is soft.      Tenderness: There is no abdominal tenderness.   Skin:     General: Skin is warm and dry.   Neurological:      Mental Status: He is alert and oriented to person, place, and time.   Psychiatric:         Mood and Affect: Mood and affect normal.         Behavior: Behavior normal.         Thought Content: Thought content normal.         Judgment: Judgment normal.        Result Review :                Assessment and Plan   Diagnoses and " all orders for this visit:    1. Atypical chest pain (Primary)    2. Gastroesophageal reflux disease without esophagitis    Atypical chest pain resolved with PPI.  Continue taking pantoprazole 20 mg daily.  Avoid any trigger foods that may cause reflux which is causing the chest pain.         Follow Up   Return in about 6 months (around 1/22/2023), or if symptoms worsen or fail to improve, for Next scheduled follow up.  Patient was given instructions and counseling regarding his condition or for health maintenance advice. Please see specific information pulled into the AVS if appropriate.

## 2022-08-11 DIAGNOSIS — E55.9 VITAMIN D DEFICIENCY: ICD-10-CM

## 2022-08-11 RX ORDER — ERGOCALCIFEROL 1.25 MG/1
50000 CAPSULE ORAL
Qty: 13 CAPSULE | Refills: 0 | Status: SHIPPED | OUTPATIENT
Start: 2022-08-11 | End: 2023-02-09 | Stop reason: SDUPTHER

## 2022-08-12 ENCOUNTER — TELEPHONE (OUTPATIENT)
Dept: CARDIOLOGY | Facility: CLINIC | Age: 42
End: 2022-08-12

## 2022-08-12 NOTE — TELEPHONE ENCOUNTER
A PA for Pantoprazole was started and approved.     The Prior Authorization request has been approved for Pantoprazole Sodium 20MG OR TBEC.  The authorization is valid from 07/13/2022 through 08/12/2023. A letter of explanation will also be  mailed to the patient

## 2023-02-09 ENCOUNTER — OFFICE VISIT (OUTPATIENT)
Dept: FAMILY MEDICINE CLINIC | Facility: CLINIC | Age: 43
End: 2023-02-09
Payer: COMMERCIAL

## 2023-02-09 VITALS
HEART RATE: 75 BPM | TEMPERATURE: 98.3 F | HEIGHT: 74 IN | DIASTOLIC BLOOD PRESSURE: 68 MMHG | WEIGHT: 260.2 LBS | OXYGEN SATURATION: 98 % | BODY MASS INDEX: 33.39 KG/M2 | SYSTOLIC BLOOD PRESSURE: 116 MMHG

## 2023-02-09 DIAGNOSIS — E55.9 VITAMIN D DEFICIENCY: ICD-10-CM

## 2023-02-09 DIAGNOSIS — K21.9 GASTROESOPHAGEAL REFLUX DISEASE WITHOUT ESOPHAGITIS: ICD-10-CM

## 2023-02-09 DIAGNOSIS — R42 DIZZINESS: ICD-10-CM

## 2023-02-09 DIAGNOSIS — Z11.59 ENCOUNTER FOR HEPATITIS C SCREENING TEST FOR LOW RISK PATIENT: ICD-10-CM

## 2023-02-09 DIAGNOSIS — E78.00 PURE HYPERCHOLESTEROLEMIA: Primary | ICD-10-CM

## 2023-02-09 DIAGNOSIS — G47.39 SLEEP APNEA-LIKE BEHAVIOR: ICD-10-CM

## 2023-02-09 DIAGNOSIS — Z51.81 MEDICATION MONITORING ENCOUNTER: ICD-10-CM

## 2023-02-09 LAB
25(OH)D3 SERPL-MCNC: 20.4 NG/ML (ref 30–100)
ALBUMIN SERPL-MCNC: 4.3 G/DL (ref 3.5–5.2)
ALBUMIN/GLOB SERPL: 1.5 G/DL
ALP SERPL-CCNC: 82 U/L (ref 39–117)
ALT SERPL W P-5'-P-CCNC: 24 U/L (ref 1–41)
ANION GAP SERPL CALCULATED.3IONS-SCNC: 8.1 MMOL/L (ref 5–15)
AST SERPL-CCNC: 15 U/L (ref 1–40)
BASOPHILS # BLD AUTO: 0.01 10*3/MM3 (ref 0–0.2)
BASOPHILS NFR BLD AUTO: 0.3 % (ref 0–1.5)
BILIRUB SERPL-MCNC: 0.7 MG/DL (ref 0–1.2)
BUN SERPL-MCNC: 14 MG/DL (ref 6–20)
BUN/CREAT SERPL: 13.7 (ref 7–25)
CALCIUM SPEC-SCNC: 9.4 MG/DL (ref 8.6–10.5)
CHLORIDE SERPL-SCNC: 103 MMOL/L (ref 98–107)
CHOLEST SERPL-MCNC: 211 MG/DL (ref 0–200)
CO2 SERPL-SCNC: 25.9 MMOL/L (ref 22–29)
CREAT SERPL-MCNC: 1.02 MG/DL (ref 0.76–1.27)
DEPRECATED RDW RBC AUTO: 35.6 FL (ref 37–54)
EGFRCR SERPLBLD CKD-EPI 2021: 94.1 ML/MIN/1.73
EOSINOPHIL # BLD AUTO: 0.04 10*3/MM3 (ref 0–0.4)
EOSINOPHIL NFR BLD AUTO: 1.1 % (ref 0.3–6.2)
ERYTHROCYTE [DISTWIDTH] IN BLOOD BY AUTOMATED COUNT: 12.9 % (ref 12.3–15.4)
FOLATE SERPL-MCNC: 11.8 NG/ML (ref 4.78–24.2)
GLOBULIN UR ELPH-MCNC: 2.9 GM/DL
GLUCOSE SERPL-MCNC: 91 MG/DL (ref 65–99)
HCT VFR BLD AUTO: 49.7 % (ref 37.5–51)
HCV AB SER DONR QL: NORMAL
HDLC SERPL-MCNC: 57 MG/DL (ref 40–60)
HGB BLD-MCNC: 16.3 G/DL (ref 13–17.7)
IMM GRANULOCYTES # BLD AUTO: 0.01 10*3/MM3 (ref 0–0.05)
IMM GRANULOCYTES NFR BLD AUTO: 0.3 % (ref 0–0.5)
LDLC SERPL CALC-MCNC: 144 MG/DL (ref 0–100)
LDLC/HDLC SERPL: 2.51 {RATIO}
LYMPHOCYTES # BLD AUTO: 1.44 10*3/MM3 (ref 0.7–3.1)
LYMPHOCYTES NFR BLD AUTO: 38.9 % (ref 19.6–45.3)
MCH RBC QN AUTO: 26.2 PG (ref 26.6–33)
MCHC RBC AUTO-ENTMCNC: 32.8 G/DL (ref 31.5–35.7)
MCV RBC AUTO: 79.8 FL (ref 79–97)
MONOCYTES # BLD AUTO: 0.28 10*3/MM3 (ref 0.1–0.9)
MONOCYTES NFR BLD AUTO: 7.6 % (ref 5–12)
NEUTROPHILS NFR BLD AUTO: 1.92 10*3/MM3 (ref 1.7–7)
NEUTROPHILS NFR BLD AUTO: 51.8 % (ref 42.7–76)
NRBC BLD AUTO-RTO: 0 /100 WBC (ref 0–0.2)
PLATELET # BLD AUTO: 203 10*3/MM3 (ref 140–450)
PMV BLD AUTO: 10.2 FL (ref 6–12)
POTASSIUM SERPL-SCNC: 3.8 MMOL/L (ref 3.5–5.2)
PROT SERPL-MCNC: 7.2 G/DL (ref 6–8.5)
RBC # BLD AUTO: 6.23 10*6/MM3 (ref 4.14–5.8)
SODIUM SERPL-SCNC: 137 MMOL/L (ref 136–145)
TRIGL SERPL-MCNC: 55 MG/DL (ref 0–150)
TSH SERPL DL<=0.05 MIU/L-ACNC: 1.35 UIU/ML (ref 0.27–4.2)
VIT B12 BLD-MCNC: 359 PG/ML (ref 211–946)
VLDLC SERPL-MCNC: 10 MG/DL (ref 5–40)
WBC NRBC COR # BLD: 3.7 10*3/MM3 (ref 3.4–10.8)

## 2023-02-09 PROCEDURE — 86803 HEPATITIS C AB TEST: CPT | Performed by: NURSE PRACTITIONER

## 2023-02-09 PROCEDURE — 80061 LIPID PANEL: CPT | Performed by: NURSE PRACTITIONER

## 2023-02-09 PROCEDURE — 82306 VITAMIN D 25 HYDROXY: CPT | Performed by: NURSE PRACTITIONER

## 2023-02-09 PROCEDURE — 80050 GENERAL HEALTH PANEL: CPT | Performed by: NURSE PRACTITIONER

## 2023-02-09 PROCEDURE — 82746 ASSAY OF FOLIC ACID SERUM: CPT | Performed by: NURSE PRACTITIONER

## 2023-02-09 PROCEDURE — 82607 VITAMIN B-12: CPT | Performed by: NURSE PRACTITIONER

## 2023-02-09 PROCEDURE — 99214 OFFICE O/P EST MOD 30 MIN: CPT | Performed by: NURSE PRACTITIONER

## 2023-02-09 RX ORDER — ERGOCALCIFEROL 1.25 MG/1
50000 CAPSULE ORAL
Qty: 13 CAPSULE | Refills: 1 | Status: SHIPPED | OUTPATIENT
Start: 2023-02-09

## 2023-02-09 NOTE — PROGRESS NOTES
"Answers for HPI/ROS submitted by the patient on 2023  What is the primary reason for your visit?: Physical    Chief Complaint  Sleep Apnea    Subjective         Humphrey Iglesias presents to [facility]  HPI   Presents today for a 6-month follow-up on hyperlipidemia, vitamin D deficiency, and reflux.  Reflux is well controlled with pantoprazole 20 mg daily.  He notes that he has had some episodes of feeling dizziness or feeling more foggy.  Episodes last about 20 seconds.  Describes dizziness as lightheadedness.  Denies chest pain, syncope, palpitations.  Reports his wife told him he has sleep apnea-like behavior.  He has moments where he wakes up gasping.  He does have sometimes daytime sleepiness.    Social History     Socioeconomic History   • Marital status:    Tobacco Use   • Smoking status: Former     Packs/day: 0.25     Years: 0.50     Pack years: 0.13     Types: Cigarettes     Quit date: 2002     Years since quittin.1   • Smokeless tobacco: Never   Vaping Use   • Vaping Use: Never used   Substance and Sexual Activity   • Alcohol use: Yes     Alcohol/week: 1.0 standard drink     Types: 1 Shots of liquor per week     Comment: occasionally drinks, less than 1 drink per day, has been drinking for 3 years   • Drug use: Not Currently     Comment: former in the past   • Sexual activity: Yes     Partners: Female     Birth control/protection: Same-sex partner        Objective     Vitals:    23 0802   BP: 116/68   Pulse: 75   Temp: 98.3 °F (36.8 °C)   SpO2: 98%   Weight: 118 kg (260 lb 3.2 oz)   Height: 188 cm (74\")        Body mass index is 33.41 kg/m².    Wt Readings from Last 3 Encounters:   23 118 kg (260 lb 3.2 oz)   22 116 kg (256 lb)   22 114 kg (251 lb)       BP Readings from Last 3 Encounters:   23 116/68   22 118/68   22 127/82         Physical Exam  Vitals reviewed.   Constitutional:       Appearance: Normal " appearance. He is well-developed.   HENT:      Head: Normocephalic and atraumatic.      Right Ear: External ear normal.      Left Ear: External ear normal.      Mouth/Throat:      Pharynx: No oropharyngeal exudate.   Eyes:      Conjunctiva/sclera: Conjunctivae normal.      Pupils: Pupils are equal, round, and reactive to light.   Cardiovascular:      Rate and Rhythm: Normal rate and regular rhythm.      Heart sounds: No murmur heard.    No friction rub. No gallop.   Pulmonary:      Effort: Pulmonary effort is normal.      Breath sounds: Normal breath sounds. No wheezing or rhonchi.   Abdominal:      General: Bowel sounds are normal. There is no distension.      Palpations: Abdomen is soft.      Tenderness: There is no abdominal tenderness.   Skin:     General: Skin is warm and dry.   Neurological:      Mental Status: He is alert and oriented to person, place, and time.   Psychiatric:         Mood and Affect: Mood and affect normal.         Behavior: Behavior normal.         Thought Content: Thought content normal.         Judgment: Judgment normal.          Result Review :   The following data was reviewed by: DOMENIC Michaud on 02/09/2023:      Procedures    Assessment and Plan   Diagnoses and all orders for this visit:    1. Pure hypercholesterolemia (Primary)  -     Lipid Panel    2. Sleep apnea-like behavior  -     Ambulatory Referral to Sleep Medicine  -     CBC & Differential  -     Comprehensive Metabolic Panel    3. Gastroesophageal reflux disease without esophagitis  -     Vitamin B12  -     Folate    4. Dizziness  -     CBC & Differential  -     Comprehensive Metabolic Panel  -     TSH Rfx On Abnormal To Free T4    5. Encounter for hepatitis C screening test for low risk patient  -     Hepatitis C Antibody    6. Vitamin D deficiency  -     Vitamin D,25-Hydroxy  -     vitamin D (ERGOCALCIFEROL) 1.25 MG (35034 UT) capsule capsule; Take 1 capsule by mouth Every 7 (Seven) Days.  Dispense: 13 capsule;  Refill: 1    7. Vitamin D deficiency  Comments:  Continue vitamin D.  Recheck vitamin D at next office visit  Orders:  -     Vitamin D,25-Hydroxy  -     vitamin D (ERGOCALCIFEROL) 1.25 MG (42481 UT) capsule capsule; Take 1 capsule by mouth Every 7 (Seven) Days.  Dispense: 13 capsule; Refill: 1    8. Medication monitoring encounter  -     Vitamin B12  -     Folate      Consult sleep medicine for evaluation of sleep apnea.  Recheck a lipid panel.  Encouraged to follow a low-cholesterol diet.  Continue the Protonix for reflux.  Check a B12 folate since he is taking Protonix.  Screen for hepatitis C.  Recheck vitamin D for vitamin D deficiency.    BMI is >= 30 and <35. (Class 1 Obesity). The following options were offered after discussion;: exercise counseling/recommendations and nutrition counseling/recommendations        Follow Up   No follow-ups on file.  Patient was given instructions and counseling regarding his condition or for health maintenance advice. Please see specific information pulled into the AVS if appropriate.     Please note that portions of this note were completed with a voice recognition program.

## 2023-06-13 ENCOUNTER — OFFICE VISIT (OUTPATIENT)
Dept: SLEEP MEDICINE | Facility: HOSPITAL | Age: 43
End: 2023-06-13
Payer: COMMERCIAL

## 2023-06-13 VITALS
HEART RATE: 84 BPM | SYSTOLIC BLOOD PRESSURE: 130 MMHG | BODY MASS INDEX: 32.21 KG/M2 | HEIGHT: 74 IN | OXYGEN SATURATION: 97 % | DIASTOLIC BLOOD PRESSURE: 71 MMHG | WEIGHT: 251 LBS

## 2023-06-13 DIAGNOSIS — E78.00 PURE HYPERCHOLESTEROLEMIA: ICD-10-CM

## 2023-06-13 DIAGNOSIS — R06.81 WITNESSED EPISODE OF APNEA: ICD-10-CM

## 2023-06-13 DIAGNOSIS — G47.30 HYPERSOMNIA WITH SLEEP APNEA: ICD-10-CM

## 2023-06-13 DIAGNOSIS — G47.10 HYPERSOMNIA WITH SLEEP APNEA: ICD-10-CM

## 2023-06-13 DIAGNOSIS — E66.9 OBESITY (BMI 30-39.9): ICD-10-CM

## 2023-06-13 DIAGNOSIS — R06.83 SNORING: ICD-10-CM

## 2023-06-13 DIAGNOSIS — R29.818 SUSPECTED SLEEP APNEA: Primary | ICD-10-CM

## 2023-06-13 PROCEDURE — 99999 PR OFFICE/OUTPT VISIT,PROCEDURE ONLY: CPT | Performed by: INTERNAL MEDICINE

## 2023-06-13 PROCEDURE — G0463 HOSPITAL OUTPT CLINIC VISIT: HCPCS

## 2023-06-13 NOTE — PROGRESS NOTES
Sleep Consultation    Patient Name: Humphrey Iglesias  Age/Sex: 43 y.o. male  : 1980  MRN: [id number]    Date of Encounter Visit: 2023  Encounter Provider: Kevin Vickers MD  Referring Provider: DOMENIC Michaud  Place of Service: [facility]  Patient Care Team:  Norbert Blount APRN as PCP - General (Nurse Practitioner)    Subjective:     Reason for Consult: Evaluate for possible sleep apnea    History of Present Illness:  Humphrey Iglesias is a 43 y.o. male is here for evaluation of KAROL due to fatigue of unexplained etiology.    Patient complains of daytime fatigue and sleepiness with an Jackson Sleepiness Scale (ESS) of 5.  Patient complains of excessive daytime fatigue, with drowsiness and subjective sleepiness despite the normal Jackson sleepiness scale.  Symptoms tends to get worse with weight gain specially with the 30 pounds recent weight gain  He does have snoring reported by his spouse   Patient is not sure about snoring but he has witnessed apnea at night and he does recall waking up gasping for breath at night which raises concern for possible obstructive sleep apnea  He may have occasional problem falling asleep but no problem staying asleep and no nocturia  Denies any symptoms of restless leg syndrome.   Patient denies any cataplexy, sleep paralysis or other symptoms to suggest narcolepsy.  Patient denies any parasomnias.  Denies any history of seizure disorder or recent head trauma.  Patient spends adequate amount of time in bed with no evidence of sleep restriction or improper sleep hygiene.  Bedtime is around 1130 to 1 AM wake up time between 6 and 8 in the morning, sleep onset within less than 30 minutes and patient does wake up feeling refreshed most of the times.  Caffeine intake is usually 1 caffeinated beverage per day, patient may have few shots of alcoholic beverages per week  Former smoker quit the age of 29  Comorbidities include negative except  "for obesity with a BMI of 32.1    Review of Systems:   A twelve-system review was conducted and was negative except for the following: Fatigue.        Past Medical History:  Past Medical History:   Diagnosis Date   • Allergic    • Allergic rhinitis        No past surgical history on file.    Home Medications:     Current Outpatient Medications:   •  Cetirizine HCl (ZyrTEC Allergy) 10 MG capsule, , Disp: , Rfl:   •  loratadine (CLARITIN) 10 MG tablet, Claritin 10 mg oral tablet take 1 tablet by oral route daily as needed   Active, Disp: , Rfl:   •  pantoprazole (Protonix) 20 MG EC tablet, Take 1 tablet by mouth Daily., Disp: 90 tablet, Rfl: 2  •  vitamin D (ERGOCALCIFEROL) 1.25 MG (07882 UT) capsule capsule, Take 1 capsule by mouth Every 7 (Seven) Days., Disp: 13 capsule, Rfl: 1    Allergies:  No Known Allergies    Past Social History:  Social History     Socioeconomic History   • Marital status:    Tobacco Use   • Smoking status: Former     Packs/day: 0.25     Years: 0.50     Pack years: 0.13     Types: Cigarettes     Quit date: 2002     Years since quittin.4   • Smokeless tobacco: Never   Vaping Use   • Vaping Use: Never used   Substance and Sexual Activity   • Alcohol use: Yes     Alcohol/week: 1.0 standard drink     Types: 1 Shots of liquor per week     Comment: occasionally drinks, less than 1 drink per day, has been drinking for 3 years   • Drug use: Not Currently     Comment: former in the past   • Sexual activity: Yes     Partners: Female     Birth control/protection: Same-sex partner       Past Family History:  Family History   Problem Relation Age of Onset   • Diabetes Mother         unspecified type   • Diabetes Father         unspecified type     KAROL  Objective:        Vital Signs:   Visit Vitals  /71 (BP Location: Right arm, Patient Position: Sitting)   Pulse 84   Ht 188 cm (74.02\")   Wt 114 kg (251 lb)   SpO2 97%   BMI 32.21 kg/m²     Wt Readings from Last 3 Encounters:   23 " 114 kg (251 lb)   02/09/23 118 kg (260 lb 3.2 oz)   07/22/22 116 kg (256 lb)     Neck Circumference: 16.5 inches    Physical Exam:   GEN:  No acute distress, alert, cooperative, well developed, muscular built    EYES:   Sclerae clear. No icterus. PERRL. Normal EOM,   ENT:   External ears/nose normal, no oral lesions, no thrush, mucous membranes moist, Septum midline. Mallampati II airway. Redundant membranous soft palate, large uvula    NECK:  Supple, midline trachea, no JVD  LUNGS: Normal chest on inspection, CTAB, no wheezes. No rhonchi. No crackles. Respirations regular, even and unlabored.   CV:  Regular rhythm and rate. Normal S1/S2. No murmurs, gallops, or rubs noted.  ABD:  Soft, nontender and nondistended. Normal bowel sounds. No guarding  EXT:  Moves all extremities well. No cyanosis. No redness. No edema.   Skin: Dry, intact, no bleeding      Diagnostic Data:  No previous sleep studies on record     Assessment and Plan:       ICD-10-CM ICD-9-CM   1. Suspected sleep apnea  R29.818 781.99   2. Hypersomnia with sleep apnea  G47.10 780.53    G47.30    3. Snoring  R06.83 786.09   4. Witnessed episode of apnea  R06.81 786.03   5. Pure hypercholesterolemia  E78.00 272.0   6. Obesity (BMI 30-39.9)  E66.9 278.00       Recommendations:     Patient is agreeable to use a CPAP in case of positive test for obstructive sleep apnea    Patient was educated in depth about KAROL and cardiovascular consequences if left untreated, including but not limited to CHF, CAD, arrhythmias, CVA, and/or HTN. Education also provided about the diagnostic tools for KAROL, including the polysomnography and the treatment modalities, including the CPAP.     If patient has obstructive sleep apnea the recommend treatment is CPAP and will start CPAP and patient will follow up within 31-90 days after starting CPAP for compliance review.   Will address alternative treatment option if intolerant to CPAP     Adherence to the CPAP is a key factor in  successful treatment of KAROL and the patient was encouraged to contact us in case of problem with the CPAP or the mask that can limit the tolerance of the compliance with the therapy.    Patient was educated about the impact of obesity on sleep apnea and the benefit of weight loss and weight loss was recommended    No orders of the defined types were placed in this encounter.    No orders of the defined types were placed in this encounter.     Return in about 3 months (around 9/13/2023).    Kevin Vickers MD   [facility]   06/13/23  13:32 EDT    Dictated utilizing Dragon dictation           no

## 2023-07-28 ENCOUNTER — TELEPHONE (OUTPATIENT)
Dept: SLEEP MEDICINE | Facility: HOSPITAL | Age: 43
End: 2023-07-28
Payer: COMMERCIAL

## 2023-07-28 NOTE — TELEPHONE ENCOUNTER
Returned patient call in regard to pressure change, will forward to provider and get back to patient

## 2023-08-03 ENCOUNTER — PRIOR AUTHORIZATION (OUTPATIENT)
Dept: FAMILY MEDICINE CLINIC | Facility: CLINIC | Age: 43
End: 2023-08-03
Payer: COMMERCIAL

## 2025-05-30 ENCOUNTER — OFFICE VISIT (OUTPATIENT)
Dept: FAMILY MEDICINE CLINIC | Facility: CLINIC | Age: 45
End: 2025-05-30
Payer: COMMERCIAL

## 2025-05-30 VITALS
OXYGEN SATURATION: 99 % | DIASTOLIC BLOOD PRESSURE: 76 MMHG | BODY MASS INDEX: 31.32 KG/M2 | TEMPERATURE: 97.7 F | HEART RATE: 78 BPM | WEIGHT: 244 LBS | SYSTOLIC BLOOD PRESSURE: 118 MMHG | HEIGHT: 74 IN

## 2025-05-30 DIAGNOSIS — K21.9 GASTROESOPHAGEAL REFLUX DISEASE WITHOUT ESOPHAGITIS: ICD-10-CM

## 2025-05-30 DIAGNOSIS — E78.00 HYPERCHOLESTEROLEMIA: ICD-10-CM

## 2025-05-30 DIAGNOSIS — Z83.3 FAMILY HISTORY OF DIABETES MELLITUS: ICD-10-CM

## 2025-05-30 DIAGNOSIS — Z00.00 ANNUAL PHYSICAL EXAM: Primary | ICD-10-CM

## 2025-05-30 DIAGNOSIS — G47.30 SLEEP APNEA, UNSPECIFIED TYPE: ICD-10-CM

## 2025-05-30 DIAGNOSIS — E55.9 VITAMIN D DEFICIENCY: ICD-10-CM

## 2025-05-30 LAB
25(OH)D3 SERPL-MCNC: 24.8 NG/ML (ref 30–100)
ALBUMIN SERPL-MCNC: 4.2 G/DL (ref 3.5–5.2)
ALBUMIN/GLOB SERPL: 1.5 G/DL
ALP SERPL-CCNC: 74 U/L (ref 39–117)
ALT SERPL W P-5'-P-CCNC: 24 U/L (ref 1–41)
ANION GAP SERPL CALCULATED.3IONS-SCNC: 9.5 MMOL/L (ref 5–15)
AST SERPL-CCNC: 14 U/L (ref 1–40)
BILIRUB SERPL-MCNC: 0.8 MG/DL (ref 0–1.2)
BUN SERPL-MCNC: 4 MG/DL (ref 6–20)
BUN/CREAT SERPL: 3.4 (ref 7–25)
CALCIUM SPEC-SCNC: 9.4 MG/DL (ref 8.6–10.5)
CHLORIDE SERPL-SCNC: 102 MMOL/L (ref 98–107)
CHOLEST SERPL-MCNC: 232 MG/DL (ref 0–200)
CO2 SERPL-SCNC: 25.5 MMOL/L (ref 22–29)
CREAT SERPL-MCNC: 1.16 MG/DL (ref 0.76–1.27)
EGFRCR SERPLBLD CKD-EPI 2021: 79.6 ML/MIN/1.73
GLOBULIN UR ELPH-MCNC: 2.8 GM/DL
GLUCOSE SERPL-MCNC: 91 MG/DL (ref 65–99)
HBA1C MFR BLD: 5.2 % (ref 4.8–5.6)
HDLC SERPL-MCNC: 57 MG/DL (ref 40–60)
LDLC SERPL CALC-MCNC: 167 MG/DL (ref 0–100)
LDLC/HDLC SERPL: 2.91 {RATIO}
POTASSIUM SERPL-SCNC: 4 MMOL/L (ref 3.5–5.2)
PROT SERPL-MCNC: 7 G/DL (ref 6–8.5)
SODIUM SERPL-SCNC: 137 MMOL/L (ref 136–145)
TRIGL SERPL-MCNC: 47 MG/DL (ref 0–150)
VLDLC SERPL-MCNC: 8 MG/DL (ref 5–40)

## 2025-05-30 PROCEDURE — 82306 VITAMIN D 25 HYDROXY: CPT | Performed by: STUDENT IN AN ORGANIZED HEALTH CARE EDUCATION/TRAINING PROGRAM

## 2025-05-30 PROCEDURE — 83036 HEMOGLOBIN GLYCOSYLATED A1C: CPT | Performed by: STUDENT IN AN ORGANIZED HEALTH CARE EDUCATION/TRAINING PROGRAM

## 2025-05-30 PROCEDURE — 99396 PREV VISIT EST AGE 40-64: CPT | Performed by: STUDENT IN AN ORGANIZED HEALTH CARE EDUCATION/TRAINING PROGRAM

## 2025-05-30 PROCEDURE — 36415 COLL VENOUS BLD VENIPUNCTURE: CPT | Performed by: STUDENT IN AN ORGANIZED HEALTH CARE EDUCATION/TRAINING PROGRAM

## 2025-05-30 PROCEDURE — 80053 COMPREHEN METABOLIC PANEL: CPT | Performed by: STUDENT IN AN ORGANIZED HEALTH CARE EDUCATION/TRAINING PROGRAM

## 2025-05-30 PROCEDURE — 80061 LIPID PANEL: CPT | Performed by: STUDENT IN AN ORGANIZED HEALTH CARE EDUCATION/TRAINING PROGRAM

## 2025-05-30 NOTE — PROGRESS NOTES
"Chief Complaint  Establish Care and Annual Exam    Subjective      Humphrey Iglesias is a 44 y.o. male who presents to [facility]     History of Present Illness  The patient is a 44-year-old male presenting to establish care.    Reflux  - History of reflux  - Previously managed with pantoprazole, now discontinued without adverse effects    Sleep Apnea  - Diagnosed with sleep apnea via home sleep study  - Monitoring snoring with a phone eligio, showing no episodes  - Improved sleep quality attributed to increased physical activity and better sleep schedule  - No daytime fatigue, tiredness linked to sleep schedule  - CPAP and under-the-nose mask were ineffective  - Melatonin gummies beneficial    Allergies  - History of allergies well-managed this year with local honey  - Currently taking Zyrtec in the morning and Claritin at night    PSA Level Check  - Interested in PSA level check despite no urinary symptoms  - Reports nocturia due to high water intake before bedtime  - No weak urine stream    Cholesterol  - Previously borderline cholesterol levels  - Resumed gym and cardio activities, consistent for the past year  - Lost 6-7 pounds since June 2023  - Previously weighed 251 pounds, highest weight 265 pounds last year    Supplemental information: Has not taken vitamin D supplements recently.    SOCIAL HISTORY  Former smoker, minimal use. Consumes alcohol less than a drink a day. Current and past marijuana use.    FAMILY HISTORY  Parents have diabetes. Father passed away at 127 years old, no known family history of heart disease or heart attacks.       Objective   Vital Signs:   Vitals:    05/30/25 0817   BP: 118/76   BP Location: Left arm   Pulse: 78   Temp: 97.7 °F (36.5 °C)   TempSrc: Oral   SpO2: 99%   Weight: 111 kg (244 lb)   Height: 188 cm (74\")     Body mass index is 31.33 kg/m².    Wt Readings from Last 3 Encounters:   05/30/25 111 kg (244 lb)   07/23/24 111 kg (244 lb 3.2 oz) "   07/11/23 113 kg (248 lb 12.8 oz)     BP Readings from Last 3 Encounters:   05/30/25 118/76   07/23/24 125/76   07/11/23 98/72       Health Maintenance   Topic Date Due    ANNUAL PHYSICAL  07/11/2024    LIPID PANEL  07/11/2024    COVID-19 Vaccine (3 - 2024-25 season) 09/01/2024    INFLUENZA VACCINE  07/01/2025    TDAP/TD VACCINES (3 - Td or Tdap) 07/12/2034    HEPATITIS C SCREENING  Completed    Pneumococcal Vaccine 0-49  Aged Out       Physical Exam  HENT:      Head: Normocephalic and atraumatic.      Right Ear: Tympanic membrane and ear canal normal.      Left Ear: Tympanic membrane and ear canal normal.      Nose: Nose normal.      Mouth/Throat:      Mouth: Mucous membranes are moist.   Eyes:      Extraocular Movements: Extraocular movements intact.      Conjunctiva/sclera: Conjunctivae normal.   Cardiovascular:      Rate and Rhythm: Normal rate and regular rhythm.      Heart sounds: No murmur heard.     No friction rub. No gallop.   Pulmonary:      Effort: No respiratory distress.      Breath sounds: No wheezing, rhonchi or rales.   Abdominal:      General: Abdomen is flat. There is no distension.      Palpations: Abdomen is soft.      Tenderness: There is no abdominal tenderness. There is no guarding or rebound.   Musculoskeletal:         General: No swelling.      Cervical back: Neck supple.   Skin:     General: Skin is warm and dry.   Neurological:      General: No focal deficit present.      Mental Status: He is alert and oriented to person, place, and time.   Psychiatric:         Mood and Affect: Mood normal.         Behavior: Behavior normal.         Thought Content: Thought content normal.         Judgment: Judgment normal.          Physical Exam      Result Review :  The following data was reviewed by: Leon Hinton DO on 05/30/2025:    No Images in the past 120 days found..     Results  Labs   - Vitamin D: 55 ng/mL   - B12: 338 pg/mL   - Thyroid Function Test: Normal   - LDL Cholesterol: 148 mg/dL    - Total Cholesterol: 212 mg/dL   - HDL Cholesterol: 54 mg/dL       Procedures          Diagnoses and all orders for this visit:    1. Annual physical exam (Primary)    2. Gastroesophageal reflux disease without esophagitis    3. Hypercholesterolemia  -     Lipid Panel  -     Comprehensive Metabolic Panel    4. Sleep apnea, unspecified type  -     Ambulatory Referral to Sleep Medicine    5. Family history of diabetes mellitus  -     Hemoglobin A1c    6. Vitamin D deficiency  -     Vitamin D,25-Hydroxy         Assessment & Plan  1. GERD.  - Symptoms well-managed without pantoprazole.  - No medication changes needed.  - Continue monitoring for recurrence.    2. Allergic Rhinitis.  - Effective management with Zyrtec and Claritin.  - No medication changes needed.  - Continue current regimen and monitor.    3. Sleep Apnea.  - CPAP and under-the-nose mask ineffective.  - Improvement with physical activity and weight loss.  - Referral to Dr. Vickers for alternative treatments.  - Discussed cardiovascular risks.    4. Cholesterol Management.  -  mg/dL, total cholesterol 212 mg/dL, HDL 54 mg/dL.  - 10-year MI/CVA risk ~2%.  - Continue lifestyle modifications, follow-up in 6 months.    5. Health Maintenance.  - Comprehensive blood work panel ordered.  - PSA test deferred until age 55 or symptom onset.  - Review blood work results via [facility].  - Follow-up in 6 months to reassess health and cholesterol levels.    BMI is >= 30 and <35. (Class 1 Obesity). The following options were offered after discussion;: exercise counseling/recommendations and nutrition counseling/recommendations         FOLLOW UP  Return in about 6 months (around 11/30/2025) for cholesterol, sleep apnea.  Patient was given instructions and counseling regarding his condition or for health maintenance advice. Please see specific information pulled into the AVS if appropriate.     Patient or patient representative verbalized consent for the use of Ambient  Listening during the visit with  Leon Hinton DO for chart documentation. 5/30/2025  17:33 EDT    Leon Hinton DO  05/30/25  17:33 EDT    CURRENT & DISCONTINUED MEDICATIONS  Current Outpatient Medications   Medication Instructions    benzonatate (TESSALON) 200 mg, Oral, 3 Times Daily PRN    Cetirizine HCl (ZyrTEC Allergy) 10 MG capsule No dose, route, or frequency recorded.    loratadine (CLARITIN) 10 MG tablet Claritin 10 mg oral tablet take 1 tablet by oral route daily as needed   Active    pantoprazole (PROTONIX) 20 mg, Oral, Daily    vitamin D (ERGOCALCIFEROL) 50,000 Units, Oral, Every 7 Days       There are no discontinued medications.

## 2025-06-25 ENCOUNTER — OFFICE VISIT (OUTPATIENT)
Dept: SLEEP MEDICINE | Facility: HOSPITAL | Age: 45
End: 2025-06-25
Payer: COMMERCIAL

## 2025-06-25 VITALS
BODY MASS INDEX: 31.1 KG/M2 | HEART RATE: 65 BPM | WEIGHT: 242.3 LBS | HEIGHT: 74 IN | SYSTOLIC BLOOD PRESSURE: 123 MMHG | DIASTOLIC BLOOD PRESSURE: 75 MMHG | OXYGEN SATURATION: 99 %

## 2025-06-25 DIAGNOSIS — E66.9 OBESITY (BMI 30-39.9): ICD-10-CM

## 2025-06-25 DIAGNOSIS — G47.33 OSA (OBSTRUCTIVE SLEEP APNEA): Primary | ICD-10-CM

## 2025-06-25 PROCEDURE — G0463 HOSPITAL OUTPT CLINIC VISIT: HCPCS

## 2025-06-25 NOTE — PROGRESS NOTES
Sleep Disorder Follow Up    Patient Name: Humphrey Iglesias  Age/Sex: 45 y.o. male  : 1980  MRN: [id number]    Date of Encounter Visit: 25   Referring Provider: Leon Hinton DO  Place of Service: [facility]  Patient Care Team:  Leon Hinton DO as PCP - General (Emergency Medicine)    PROBLEM LIST:  Obstructive sleep apnea  Hypersomnia  Obesity    History of Present Illness:  Humphrey Iglesias is a 45 y.o. male who is here for follow up on obstructive sleep apnea. Patient was last seen in the office on 2023.  Since last visit, patient had a home sleep study that was positive for mild sleep apnea but it was felt that this was underestimated given the loss of signal during the - of the study  Patient was started on auto CPAP, and he failed to follow-up since.  Patient is not using the CPAP, the last time he used it was back in  and for only 1 night  He has been exercising regularly and he is feeling better , following better sleep schedule   He is feeling much better, even his snoring is much less noticed by his spouse   Patient sleeps better and has a deeper sleep with the machine and feels more energy during the day time.  Currently on auto CPAP 4-20 cm H20.  Bridger Sleepiness Scale (ESS) is 5.  Compliance download was reviewed and documented below.  Weight is down by 9 pounds since last visit.  Other comorbidities include hyperlipidemia and obesity    Review of Systems:   A ten-system review was conducted and was negative except as discussed above       Past Medical History:  Past medical, surgical, social, and family history, except as mentioned above, was unchanged from the last visit.     Past Medical History:   Diagnosis Date    Allergic     Allergic rhinitis     GERD (gastroesophageal reflux disease)        History reviewed. No pertinent surgical history.    Home Medications:     Current Outpatient Medications:     Cetirizine HCl (ZyrTEC Allergy) 10  "MG capsule, , Disp: , Rfl:     Cholecalciferol 20 MCG (800 UNIT) tablet, Take 1 tablet by mouth Daily., Disp: 90 tablet, Rfl: 1    loratadine (CLARITIN) 10 MG tablet, Claritin 10 mg oral tablet take 1 tablet by oral route daily as needed   Active, Disp: , Rfl:     pantoprazole (Protonix) 20 MG EC tablet, Take 1 tablet by mouth Daily., Disp: 90 tablet, Rfl: 2    vitamin D (ERGOCALCIFEROL) 1.25 MG (79496 UT) capsule capsule, Take 1 capsule by mouth Every 7 (Seven) Days., Disp: 13 capsule, Rfl: 2    benzonatate (TESSALON) 200 MG capsule, Take 1 capsule by mouth 3 (Three) Times a Day As Needed for Cough. (Patient not taking: Reported on 2025), Disp: 30 capsule, Rfl: 0    Allergies:  No Known Allergies    Past Social History:  Social History     Socioeconomic History    Marital status:    Tobacco Use    Smoking status: Former     Current packs/day: 0.00     Average packs/day: 0.3 packs/day for 0.6 years (0.1 ttl pk-yrs)     Types: Cigarettes     Start date: 7/3/2001     Quit date: 2002     Years since quittin.4    Smokeless tobacco: Never   Vaping Use    Vaping status: Never Used   Substance and Sexual Activity    Alcohol use: Yes     Alcohol/week: 1.0 standard drink of alcohol     Types: 1 Shots of liquor per week     Comment: occasionally drinks, less than 1 drink per day, has been drinking for 3 years    Drug use: Yes     Frequency: 1.0 times per week     Types: Marijuana     Comment: former in the past    Sexual activity: Yes     Partners: Female     Birth control/protection: Tubal ligation       Past Family History:  Family History   Problem Relation Age of Onset    Diabetes Mother         unspecified type    Diabetes Father         unspecified type         Objective:        Vital Signs:   Visit Vitals  /75   Pulse 65   Ht 188 cm (74.02\")   Wt 110 kg (242 lb 4.8 oz)   SpO2 99%   BMI 31.10 kg/m²     Wt Readings from Last 3 Encounters:   25 110 kg (242 lb 4.8 oz)   25 111 kg (244 " lb)   07/23/24 111 kg (244 lb 3.2 oz)          Physical Exam:   GEN:  No acute distress, alert, cooperative, well developed, muscular built    EYES:   Sclerae clear. No icterus. PERRL. Normal EOM,   ENT:   External ears/nose normal, no oral lesions, no thrush, mucous membranes moist, Septum midline. Mallampati II airway. Redundant membranous soft palate, large uvula     NECK:  Supple, midline trachea, no JVD  LUNGS: Normal chest on inspection, CTAB, no wheezes. No rhonchi. No crackles. Respirations regular, even and unlabored.   CV:  Regular rhythm and rate. Normal S1/S2. No murmurs, gallops, or rubs noted.  ABD:  Soft, nontender and nondistended. Normal bowel sounds. No guarding  EXT:  Moves all extremities well. No cyanosis. No redness. No edema.   Skin: Dry, intact, no bleeding    Diagnostic Data:  This was a home sleep study done on 6/18/2023       Respiratory Disturbance Events:     Documented weight on the night of the sleep study was 113.9 kg with a BMI of 32.2 kg/m²  Total recording time 378.7-minute with a monitoring time of 376.2 minutes  Respiratory summary was positive for mild obstructive sleep apnea with AHI of 5.1.  No central apnea documented  Patient had no events when in the right position but patient spent most of the time in the supine position  Patient had geo desaturation down to 84% with a total hypoxemia time of 22.1-minute.  Percentage of snoring was 6.8%  Heart rate was mostly within normal range was couple of episode of bradycardia down to 48 bpm  Impression:  Mild KAROL  Sleep-related hypoxemia  Positive snoring     Plan:  Given the presence of hypoxemia and the symptoms with nonrefreshing sleep, treatment trial is indicated  Will initiate auto CPAP trial with a minimum CPAP pressure of 6 and  maximum CPAP pressure of 20  We will follow-up on the compliance download for further recommendations    Compliance download from 10/6/2024 - 12/4/2024 showed 2% adherence    CPAP 4-20 with the  median/95th percentile pressure of 4.0/4.4 cm H2O with residual AHI of 0.0  Mild to moderate air leak with a median/95th percentile of 15.6/26.4 L/min      Assessment and Plan:       ICD-10-CM ICD-9-CM   1. KAROL (obstructive sleep apnea)  G47.33 327.23   2. Obesity (BMI 30-39.9)  E66.9 278.00       Recommendations:   The sleep study results were reviewed, summarized above and discussed with the patient as well as the information from the CPAP download  Patient has a mild case of sleep apnea  He lost 10 pounds, he is following proper sleep hygiene measures and proper healthier lifestyle  He is not drinking before bedtime  He is feeling better, sleeping better, denying any daytime symptoms, even his snoring is much less noticeable by his spouse  Since he has a very mild case of sleep apnea, any treatment was mainly to help with the fatigue and sleepiness and the symptom and the sleep apnea itself has minimal cardiovascular impact and currently I do not see the indication to treat  Patient was reassured, encouraged to continue with the proper sleep hygiene measures  Patient was instructed that he might have more apneas if he is to consume alcohol before bedtime or if he is to be on any pain medication  Patient is to keep the weight off and sleep apnea may recur or get worse with significant weight gain  Patient clearly verbalized understanding the above, we will follow-up on a as needed basis  No need to continue with the CPAP at this point        No orders of the defined types were placed in this encounter.    No orders of the defined types were placed in this encounter.    Return if symptoms worsen or fail to improve.    Kevin Vickers MD   [facility]   06/25/25  15:08 EDT    Dictated utilizing Dragon dictation